# Patient Record
Sex: MALE | Race: WHITE | NOT HISPANIC OR LATINO | Employment: OTHER | URBAN - METROPOLITAN AREA
[De-identification: names, ages, dates, MRNs, and addresses within clinical notes are randomized per-mention and may not be internally consistent; named-entity substitution may affect disease eponyms.]

---

## 2017-05-03 ENCOUNTER — OFFICE VISIT (OUTPATIENT)
Dept: MEDICAL GROUP | Facility: MEDICAL CENTER | Age: 77
End: 2017-05-03
Payer: MEDICARE

## 2017-05-03 VITALS
RESPIRATION RATE: 16 BRPM | OXYGEN SATURATION: 97 % | SYSTOLIC BLOOD PRESSURE: 130 MMHG | HEIGHT: 70 IN | WEIGHT: 154 LBS | TEMPERATURE: 97.2 F | DIASTOLIC BLOOD PRESSURE: 76 MMHG | BODY MASS INDEX: 22.05 KG/M2 | HEART RATE: 69 BPM

## 2017-05-03 DIAGNOSIS — M81.0 AGE-RELATED OSTEOPOROSIS WITHOUT CURRENT PATHOLOGICAL FRACTURE: ICD-10-CM

## 2017-05-03 DIAGNOSIS — G62.9 NEUROPATHY: ICD-10-CM

## 2017-05-03 DIAGNOSIS — E78.5 DYSLIPIDEMIA: ICD-10-CM

## 2017-05-03 DIAGNOSIS — F41.9 ANXIETY: ICD-10-CM

## 2017-05-03 PROCEDURE — 3017F COLORECTAL CA SCREEN DOC REV: CPT | Performed by: NURSE PRACTITIONER

## 2017-05-03 PROCEDURE — G8420 CALC BMI NORM PARAMETERS: HCPCS | Performed by: NURSE PRACTITIONER

## 2017-05-03 PROCEDURE — 4040F PNEUMOC VAC/ADMIN/RCVD: CPT | Mod: 8P | Performed by: NURSE PRACTITIONER

## 2017-05-03 PROCEDURE — G8432 DEP SCR NOT DOC, RNG: HCPCS | Performed by: NURSE PRACTITIONER

## 2017-05-03 PROCEDURE — 1036F TOBACCO NON-USER: CPT | Performed by: NURSE PRACTITIONER

## 2017-05-03 PROCEDURE — 99203 OFFICE O/P NEW LOW 30 MIN: CPT | Performed by: NURSE PRACTITIONER

## 2017-05-03 PROCEDURE — 1101F PT FALLS ASSESS-DOCD LE1/YR: CPT | Performed by: NURSE PRACTITIONER

## 2017-05-03 RX ORDER — ALPRAZOLAM 0.25 MG/1
0.25 TABLET ORAL NIGHTLY PRN
Qty: 15 TAB | Refills: 2 | Status: SHIPPED | OUTPATIENT
Start: 2017-05-03 | End: 2017-09-26 | Stop reason: SDUPTHER

## 2017-05-03 RX ORDER — ALPRAZOLAM 0.25 MG/1
0.25 TABLET ORAL NIGHTLY PRN
COMMUNITY
End: 2017-05-03 | Stop reason: SDUPTHER

## 2017-05-03 ASSESSMENT — PATIENT HEALTH QUESTIONNAIRE - PHQ9: CLINICAL INTERPRETATION OF PHQ2 SCORE: 0

## 2017-05-03 ASSESSMENT — ENCOUNTER SYMPTOMS: NERVOUS/ANXIOUS: 1

## 2017-05-03 NOTE — MR AVS SNAPSHOT
"Augustus Richardson   5/3/2017 8:00 AM   Office Visit   MRN: [id number]    Department:  [address] [place] [address]   Dept Phone:  [phone]    Description:  Male : 1940   Provider:  JIL Garcia           Reason for Visit     Establish Care     Medication Refill           Allergies as of 5/3/2017     No Known Allergies      You were diagnosed with     Age related osteoporosis, unspecified pathological fracture presence   [4444750]       Neuropathy (CMS-HCC)   [934966]       Anxiety   [164100]       Dyslipidemia   [902505]         Vital Signs     Blood Pressure Pulse Temperature Respirations Height Weight    130/76 mmHg 69 36.2 °C (97.2 °F) 16 1.778 m (5' 10\") 69.854 kg (154 lb)    Body Mass Index Oxygen Saturation Smoking Status             22.10 kg/m2 97% Never Smoker          Basic Information     Date Of Birth Sex Preferred Language          1940 Male English        Problem List              ICD-10-CM Priority Class Noted - Resolved    Neuropathy (CMS-HCC) G62.9   5/3/2017 - Present    Age related osteoporosis M81.0   5/3/2017 - Present    Anxiety F41.9   5/3/2017 - Present    Dyslipidemia E78.5   5/3/2017 - Present      Health Maintenance        Date Due Completion Dates    IMM DTaP/Tdap/Td Vaccine (1 - Tdap) 1961 ---    IMM ZOSTER VACCINE 2020 (Originally 2002) ---    IMM PNEUMOCOCCAL 65+ (ADULT) LOW/MEDIUM RISK SERIES (1 of 2 - PCV13) 2021 (Originally 2007) ---    COLONOSCOPY 2026 (Done)    Override on 2016: Done (in [place])            Current Immunizations     No immunizations on file.      Below and/or attached are the medications your provider expects you to take. Review all of your home medications and newly ordered medications with your provider and/or pharmacist. Follow medication instructions as directed by your provider and/or pharmacist. Please keep your medication list with you and share with your provider. Update the " information when medications are discontinued, doses are changed, or new medications (including over-the-counter products) are added; and carry medication information at all times in the event of emergency situations     Allergies:  No Known Allergies          Medications  Valid as of: May 03, 2017 -  8:31 AM    Generic Name Brand Name Tablet Size Instructions for use    ALPRAZolam (Tab) XANAX 0.25 MG Take 1 Tab by mouth at bedtime as needed for Sleep.        .                 Medicines prescribed today were sent to:     None      Medication refill instructions:       If your prescription bottle indicates you have medication refills left, it is not necessary to call your provider’s office. Please contact your pharmacy and they will refill your medication.    If your prescription bottle indicates you do not have any refills left, you may request refills at any time through one of the following ways: The online MolecularMD system (except Urgent Care), by calling your provider’s office, or by asking your pharmacy to contact your provider’s office with a refill request. Medication refills are processed only during regular business hours and may not be available until the next business day. Your provider may request additional information or to have a follow-up visit with you prior to refilling your medication.   *Please Note: Medication refills are assigned a new Rx number when refilled electronically. Your pharmacy may indicate that no refills were authorized even though a new prescription for the same medication is available at the pharmacy. Please request the medicine by name with the pharmacy before contacting your provider for a refill.        Your To Do List     Future Labs/Procedures Complete By Expires    COMP METABOLIC PANEL  As directed 5/4/2018    LIPID PROFILE  As directed 5/4/2018    TSH  As directed 5/4/2018         MolecularMD Access Code: [email or web address]  Expires: 6/2/2017  8:31 AM    MolecularMD  A secure, online  tool to manage your health information     [employer]’s Top10.com® is a secure, online tool that connects you to your personalized health information from the privacy of your home -- day or night - making it very easy for you to manage your healthcare. Once the activation process is completed, you can even access your medical information using the Top10.com andre, which is available for free in the Apple Andre store or Google Play store.     [email or web address].com provides the following levels of access (as shown below):   My Chart Features   [facility] Doctor [facility]  Specialists [facility]  Urgent  Care Non-[facility]  Primary Care  Doctor   Email your healthcare team securely and privately 24/7 X X X    Manage appointments: schedule your next appointment; view details of past/upcoming appointments X      Request prescription refills. X      View recent personal medical records, including lab and immunizations X X X X   View health record, including health history, allergies, medications X X X X   Read reports about your outpatient visits, procedures, consult and ER notes X X X X   See your discharge summary, which is a recap of your hospital and/or ER visit that includes your diagnosis, lab results, and care plan. X X       How to register for [email or web address].com:  1. Go to  [email or web address].  2. Click on the Sign Up Now box, which takes you to the New Member Sign Up page. You will need to provide the following information:  a. Enter your Top10.com Access Code exactly as it appears at the top of this page. (You will not need to use this code after you’ve completed the sign-up process. If you do not sign up before the expiration date, you must request a new code.)   b. Enter your date of birth.   c. Enter your home email address.   d. Click Submit, and follow the next screen’s instructions.  3. Create a Top10.com ID. This will be your Top10.com login ID and cannot be changed, so think of one that is secure and easy to remember.  4. Create a  PowerbyProxi password. You can change your password at any time.  5. Enter your Password Reset Question and Answer. This can be used at a later time if you forget your password.   6. Enter your e-mail address. This allows you to receive e-mail notifications when new information is available in PowerbyProxi.  7. Click Sign Up. You can now view your health information.    For assistance activating your PowerbyProxi account, call [phone]

## 2017-05-03 NOTE — Clinical Note
[facility]  JIL Garcia  [address]  [place] [address]  Fax: [phone]   Authorization for Release/Disclosure of   Protected Health Information   Name: NILS CROFT : 1940 SSN: [id number]   Address: [address] [place] [address] [address]  [place] [address] Phone:    [phone] (home)    I authorize the entity listed below to release/disclose the PHI below to:   [facility]/JIL Garcia and JIL Garcia   Provider or Entity Name:     Address   City, State, [facility]   Phone:      Fax:     Reason for request: continuity of care   Information to be released:    [  ] LAST COLONOSCOPY,  including any PATH REPORT and follow-up  [  ] LAST FIT/COLOGUARD RESULT [  ] LAST DEXA  [  ] LAST MAMMOGRAM  [  ] LAST PAP  [  ] LAST LABS [  ] RETINA EXAM REPORT  [  ] IMMUNIZATION RECORDS  [  ] Release all info      [  ] Check here and initial the line next to each item to release ALL health information INCLUDING  _____ Care and treatment for drug and / or alcohol abuse  _____ HIV testing, infection status, or AIDS  _____ Genetic Testing    DATES OF SERVICE OR TIME PERIOD TO BE DISCLOSED: _____________  I understand and acknowledge that:  * This Authorization may be revoked at any time by you in writing, except if your health information has already been used or disclosed.  * Your health information that will be used or disclosed as a result of you signing this authorization could be re-disclosed by the recipient. If this occurs, your re-disclosed health information may no longer be protected by State or Federal laws.  * You may refuse to sign this Authorization. Your refusal will not affect your ability to obtain treatment.  * This Authorization becomes effective upon signing and will  on (date) __________.      If no date is indicated, this Authorization will  one (1) year from the signature date.    Name: Nils Croft    Signature:   Date:     5/3/2017       PLEASE FAX  REQUESTED RECORDS BACK TO: [phone]

## 2017-05-03 NOTE — PROGRESS NOTES
"Subjective:      Augustus Richardson is a 76 y.o. male who presents with Establish Care and Medication Refill            HPI Augustus Richardson is here today to establish care and for need of medications. He states he moved here from [facility] approximately a month ago and needs to establish with a new PCP.      1. Age related osteoporosis, no fracture present.  Patient states he was told he had osteoporosis but does not like to use prescription medicines and treats this with \"natural remedies\". He denies recent fractures.    2. Neuropathy (CMS-[facility])  Patient reports he was told he had neuropathy by risk chiropractor but did not have EMG studies. He states it affects his feet and hands but not on a regular basis and he takes no medicine for this.    3. Anxiety  Patient reports the only prescription medicine he uses Xanax 0.25 mg. He states he does not use it on a daily basis but does like to have it available to take at night for anxiety. He does not feel he has daily anxiety requiring an SSRI. He reports no side effects from the medicine.    4. Dyslipidemia  Patient states he did have elevated cholesterol in the past but did not want to use prescription medicine and uses natural medicines.    Current Outpatient Prescriptions   Medication Sig Dispense Refill   • alprazolam (XANAX) 0.25 MG Tab Take 1 Tab by mouth at bedtime as needed for Sleep. 15 Tab 2     No current facility-administered medications for this visit.     Social History   Substance Use Topics   • Smoking status: Never Smoker    • Smokeless tobacco: Never Used   • Alcohol Use: Yes      Comment: occas     Family History   Problem Relation Age of Onset   • Other Mother      unknown   • Cancer Father      pancreatic   History reviewed. No pertinent past medical history.    Review of Systems   Psychiatric/Behavioral: The patient is nervous/anxious.    All other systems reviewed and are negative.         Objective:     /76 mmHg  Pulse 69  Temp(Src) " "36.2 °C (97.2 °F)  Resp 16  Ht 1.778 m (5' 10\")  Wt 69.854 kg (154 lb)  BMI 22.10 kg/m2  SpO2 97%     Physical Exam   Constitutional: He is oriented to person, place, and time. He appears well-developed and well-nourished. No distress.   HENT:   Head: Normocephalic and atraumatic.   Right Ear: External ear normal.   Left Ear: External ear normal.   Nose: Nose normal.   Mouth/Throat: Oropharynx is clear and moist.   Eyes: Conjunctivae are normal. Right eye exhibits no discharge. Left eye exhibits no discharge.   Neck: Normal range of motion. Neck supple. No tracheal deviation present. No thyromegaly present.   Cardiovascular: Normal rate, regular rhythm and normal heart sounds.    No murmur heard.  Pulmonary/Chest: Effort normal and breath sounds normal. No respiratory distress. He has no wheezes. He has no rales.   Lymphadenopathy:     He has no cervical adenopathy.   Neurological: He is alert and oriented to person, place, and time. Coordination normal.   Skin: Skin is warm and dry. No rash noted. He is not diaphoretic. No erythema.   Psychiatric: He has a normal mood and affect. His behavior is normal. Judgment and thought content normal.   Nursing note and vitals reviewed.              Assessment/Plan:     1. Neuropathy (CMS-[facility])  Patient states this was diagnosed by his chiropractor and has not had EMG studies.    2. Anxiety  I reviewed with patient the risks with long-term Xanax usage, especially in his age group. He did sign a controlled substance agreement today. He does not appear to be using on a daily basis although I was unable to check on the Board of pharmacy site today because it was not working. He felt #15 would be adequate for the month. He does not feel he needs counseling. He reports no falls with the medicine.  - alprazolam (XANAX) 0.25 MG Tab; Take 1 Tab by mouth at bedtime as needed for Sleep.  Dispense: 15 Tab; Refill: 2  - Controlled Substance Treatment Agreement  - TSH; Future    3. " Dyslipidemia  I will do lab work before his next visit and we'll discuss treatment options if his cholesterol is still high.  - COMP METABOLIC PANEL; Future  - LIPID PROFILE; Future    4. Age-related osteoporosis without current pathological fracture  Unclear as to how this was diagnosed but we will try to get records and patient states he would not take a bisphosphonate if it was offered.

## 2017-06-28 ENCOUNTER — HOSPITAL ENCOUNTER (OUTPATIENT)
Dept: LAB | Facility: MEDICAL CENTER | Age: 77
End: 2017-06-28
Attending: NURSE PRACTITIONER
Payer: MEDICARE

## 2017-06-28 ENCOUNTER — OFFICE VISIT (OUTPATIENT)
Dept: MEDICAL GROUP | Facility: MEDICAL CENTER | Age: 77
End: 2017-06-28
Payer: MEDICARE

## 2017-06-28 VITALS
HEART RATE: 74 BPM | HEIGHT: 70 IN | OXYGEN SATURATION: 96 % | BODY MASS INDEX: 21.9 KG/M2 | DIASTOLIC BLOOD PRESSURE: 74 MMHG | RESPIRATION RATE: 16 BRPM | WEIGHT: 153 LBS | SYSTOLIC BLOOD PRESSURE: 124 MMHG | TEMPERATURE: 98.6 F

## 2017-06-28 DIAGNOSIS — F41.9 ANXIETY: ICD-10-CM

## 2017-06-28 DIAGNOSIS — E78.5 DYSLIPIDEMIA: ICD-10-CM

## 2017-06-28 DIAGNOSIS — Z91.09 ENVIRONMENTAL ALLERGIES: ICD-10-CM

## 2017-06-28 LAB
ALBUMIN SERPL BCP-MCNC: 4 G/DL (ref 3.2–4.9)
ALBUMIN/GLOB SERPL: 1.5 G/DL
ALP SERPL-CCNC: 73 U/L (ref 30–99)
ALT SERPL-CCNC: 12 U/L (ref 2–50)
ANION GAP SERPL CALC-SCNC: 5 MMOL/L (ref 0–11.9)
AST SERPL-CCNC: 18 U/L (ref 12–45)
BILIRUB SERPL-MCNC: 0.7 MG/DL (ref 0.1–1.5)
BUN SERPL-MCNC: 22 MG/DL (ref 8–22)
CALCIUM SERPL-MCNC: 9.1 MG/DL (ref 8.5–10.5)
CHLORIDE SERPL-SCNC: 108 MMOL/L (ref 96–112)
CHOLEST SERPL-MCNC: 227 MG/DL (ref 100–199)
CO2 SERPL-SCNC: 29 MMOL/L (ref 20–33)
CREAT SERPL-MCNC: 1.13 MG/DL (ref 0.5–1.4)
GFR SERPL CREATININE-BSD FRML MDRD: >60 ML/MIN/1.73 M 2
GLOBULIN SER CALC-MCNC: 2.6 G/DL (ref 1.9–3.5)
GLUCOSE SERPL-MCNC: 89 MG/DL (ref 65–99)
HDLC SERPL-MCNC: 42 MG/DL
LDLC SERPL CALC-MCNC: 160 MG/DL
POTASSIUM SERPL-SCNC: 4.4 MMOL/L (ref 3.6–5.5)
PROT SERPL-MCNC: 6.6 G/DL (ref 6–8.2)
SODIUM SERPL-SCNC: 142 MMOL/L (ref 135–145)
TRIGL SERPL-MCNC: 127 MG/DL (ref 0–149)
TSH SERPL DL<=0.005 MIU/L-ACNC: 0.84 UIU/ML (ref 0.3–3.7)

## 2017-06-28 PROCEDURE — 80053 COMPREHEN METABOLIC PANEL: CPT

## 2017-06-28 PROCEDURE — 36415 COLL VENOUS BLD VENIPUNCTURE: CPT

## 2017-06-28 PROCEDURE — 80061 LIPID PANEL: CPT

## 2017-06-28 PROCEDURE — 84443 ASSAY THYROID STIM HORMONE: CPT

## 2017-06-28 PROCEDURE — 99214 OFFICE O/P EST MOD 30 MIN: CPT | Performed by: NURSE PRACTITIONER

## 2017-06-28 RX ORDER — FLUTICASONE PROPIONATE 50 MCG
2 SPRAY, SUSPENSION (ML) NASAL DAILY
Qty: 16 G | Refills: 11 | COMMUNITY
Start: 2017-06-28 | End: 2017-12-21

## 2017-06-28 RX ORDER — FEXOFENADINE HCL 180 MG/1
180 TABLET ORAL DAILY
Qty: 30 TAB | COMMUNITY
Start: 2017-06-28 | End: 2017-12-21

## 2017-06-28 RX ORDER — BENZONATATE 100 MG/1
100 CAPSULE ORAL
Qty: 30 CAP | Refills: 2 | Status: SHIPPED | OUTPATIENT
Start: 2017-06-28 | End: 2017-12-21

## 2017-06-28 NOTE — MR AVS SNAPSHOT
"        Augustus Richardson   2017 9:20 AM   Office Visit   MRN: [id number]    Department:  [address] [place] [address]   Dept Phone:  [phone]    Description:  Male : 1940   Provider:  JIL Garcia           Reason for Visit     Follow-Up           Allergies as of 2017     No Known Allergies      You were diagnosed with     Environmental allergies   [640235]       Anxiety   [631997]       Dyslipidemia   [689728]         Vital Signs     Blood Pressure Pulse Temperature Respirations Height Weight    124/74 mmHg 74 37 °C (98.6 °F) 16 1.778 m (5' 10\") 69.4 kg (153 lb)    Body Mass Index Oxygen Saturation Smoking Status             21.95 kg/m2 96% Never Smoker          Basic Information     Date Of Birth Sex Race Ethnicity Preferred Language    1940 Male Unable to Obtain Non- English      Problem List              ICD-10-CM Priority Class Noted - Resolved    Neuropathy (CMS-HCC) G62.9   5/3/2017 - Present    Age related osteoporosis M81.0   5/3/2017 - Present    Anxiety F41.9   5/3/2017 - Present    Dyslipidemia E78.5   5/3/2017 - Present    Environmental allergies Z91.09   2017 - Present      Health Maintenance        Date Due Completion Dates    IMM DTaP/Tdap/Td Vaccine (1 - Tdap) 1961 ---    IMM ZOSTER VACCINE 2020 (Originally 2002) ---    IMM PNEUMOCOCCAL 65+ (ADULT) LOW/MEDIUM RISK SERIES (1 of 2 - PCV13) 2021 (Originally 2007) ---    COLONOSCOPY 2026 (Done)    Override on 2016: Done (in [place])            Current Immunizations     No immunizations on file.      Below and/or attached are the medications your provider expects you to take. Review all of your home medications and newly ordered medications with your provider and/or pharmacist. Follow medication instructions as directed by your provider and/or pharmacist. Please keep your medication list with you and share with your provider. Update the information when medications " are discontinued, doses are changed, or new medications (including over-the-counter products) are added; and carry medication information at all times in the event of emergency situations     Allergies:  No Known Allergies          Medications  Valid as of: June 28, 2017 -  9:30 AM    Generic Name Brand Name Tablet Size Instructions for use    ALPRAZolam (Tab) XANAX 0.25 MG Take 1 Tab by mouth at bedtime as needed for Sleep.        Benzonatate (Cap) TESSALON 100 MG Take 1 Cap by mouth 1 time daily as needed for Cough.        Fexofenadine HCl (Tab) ALLEGRA 180 MG Take 1 Tab by mouth every day.        Fluticasone Propionate (Suspension) FLONASE 50 MCG/ACT Spray 2 Sprays in nose every day.        .                 Medicines prescribed today were sent to:     [facility] [address] [place] [address]    Phone: [phone] Fax: [phone]    Open 24 Hours?: No      Medication refill instructions:       If your prescription bottle indicates you have medication refills left, it is not necessary to call your provider’s office. Please contact your pharmacy and they will refill your medication.    If your prescription bottle indicates you do not have any refills left, you may request refills at any time through one of the following ways: The online ConnectSoft system (except Urgent Care), by calling your provider’s office, or by asking your pharmacy to contact your provider’s office with a refill request. Medication refills are processed only during regular business hours and may not be available until the next business day. Your provider may request additional information or to have a follow-up visit with you prior to refilling your medication.   *Please Note: Medication refills are assigned a new Rx number when refilled electronically. Your pharmacy may indicate that no refills were authorized even though a new prescription for the same medication is available at the  pharmacy. Please request the medicine by name with the pharmacy before contacting your provider for a refill.           Entreda Access Code: [email or web address]  Expires: 7/28/2017  9:30 AM    Entreda  A secure, online tool to manage your health information     The [employer] Entreda® is a secure, online tool that connects you to your personalized health information from the privacy of your home -- day or night - making it very easy for you to manage your healthcare. Once the activation process is completed, you can even access your medical information using the Entreda andre, which is available for free in the Apple Andre store or Google Play store.     Entreda provides the following levels of access (as shown below):   My Chart Features   [facility] Primary [facility] Doctor [facility]  Specialists [facility]  Urgent  Care Non-[facility]  Primary Care  Doctor   Email your healthcare team securely and privately 24/7 X X X    Manage appointments: schedule your next appointment; view details of past/upcoming appointments X      Request prescription refills. X      View recent personal medical records, including lab and immunizations X X X X   View health record, including health history, allergies, medications X X X X   Read reports about your outpatient visits, procedures, consult and ER notes X X X X   See your discharge summary, which is a recap of your hospital and/or ER visit that includes your diagnosis, lab results, and care plan. X X       How to register for Entreda:  1. Go to  [email or web address].  2. Click on the Sign Up Now box, which takes you to the New Member Sign Up page. You will need to provide the following information:  a. Enter your Entreda Access Code exactly as it appears at the top of this page. (You will not need to use this code after you’ve completed the sign-up process. If you do not sign up before the expiration date, you must request a new code.)   b. Enter your date of birth.   c. Enter your home email  address.   d. Click Submit, and follow the next screen’s instructions.  3. Create a The Political Studentt ID. This will be your Kensho login ID and cannot be changed, so think of one that is secure and easy to remember.  4. Create a The Political Studentt password. You can change your password at any time.  5. Enter your Password Reset Question and Answer. This can be used at a later time if you forget your password.   6. Enter your e-mail address. This allows you to receive e-mail notifications when new information is available in Kensho.  7. Click Sign Up. You can now view your health information.    For assistance activating your Kensho account, call [phone]

## 2017-06-28 NOTE — PROGRESS NOTES
"Subjective:      Augustus Richardson is a 76 y.o. male who presents with Follow-Up            HPI Augustus Richardson is here today requesting a prescription for benzonatate as well as to talk about his allergies.      1. Environmental allergies  Patient reports he tends to get allergies certain times of year and symptoms usually include cough, postnasal drip, sinus congestion and watery itchy eyes. He has not been using anything on a regular basis for this. He denies fever, chills, sore throat or ear pain. He has with him a coupon for benzoate and would like to have this available for his cough when it occurs at night.    2. Anxiety  The patient's last visit he was given a prescription for Xanax which he states he filled and uses infrequently but there is nothing on the prescription monitoring program for this.    3. Dyslipidemia  Patient reports he will be doing his lab work today. He is not currently on a statin and probably would not go on one because he states he likes to use \"natural\" medications.    Social History   Substance Use Topics   • Smoking status: Never Smoker    • Smokeless tobacco: Never Used   • Alcohol Use: Yes      Comment: occas     Current Outpatient Prescriptions   Medication Sig Dispense Refill   • benzonatate (TESSALON) 100 MG Cap Take 1 Cap by mouth 1 time daily as needed for Cough. 30 Cap 2   • fluticasone (FLONASE) 50 MCG/ACT nasal spray Spray 2 Sprays in nose every day. 16 g 11   • fexofenadine (ALLEGRA) 180 MG tablet Take 1 Tab by mouth every day. 30 Tab    • alprazolam (XANAX) 0.25 MG Tab Take 1 Tab by mouth at bedtime as needed for Sleep. 15 Tab 2     No current facility-administered medications for this visit.     Family History   Problem Relation Age of Onset   • Other Mother      unknown   • Cancer Father      pancreatic   History reviewed. No pertinent past medical history.    Review of Systems   Endo/Heme/Allergies: Positive for environmental allergies.   All other systems reviewed " "and are negative.         Objective:     /74 mmHg  Pulse 74  Temp(Src) 37 °C (98.6 °F)  Resp 16  Ht 1.778 m (5' 10\")  Wt 69.4 kg (153 lb)  BMI 21.95 kg/m2  SpO2 96%     Physical Exam   Constitutional: He is oriented to person, place, and time. He appears well-developed and well-nourished. No distress.   HENT:   Head: Normocephalic and atraumatic.   Right Ear: External ear normal.   Left Ear: External ear normal.   Nose: Nose normal.   Mouth/Throat: Oropharynx is clear and moist.   Eyes: Conjunctivae are normal. Right eye exhibits no discharge. Left eye exhibits no discharge.   Neck: Normal range of motion. Neck supple. No tracheal deviation present. No thyromegaly present.   Cardiovascular: Normal rate, regular rhythm and normal heart sounds.    No murmur heard.  Pulmonary/Chest: Effort normal and breath sounds normal. No respiratory distress. He has no wheezes. He has no rales.   Lymphadenopathy:     He has no cervical adenopathy.   Neurological: He is alert and oriented to person, place, and time. Coordination normal.   Skin: Skin is warm and dry. No rash noted. He is not diaphoretic. No erythema.   Psychiatric: He has a normal mood and affect. His behavior is normal. Judgment and thought content normal.   Nursing note and vitals reviewed.              Assessment/Plan:   1. Environmental allergies  I advised patient his first line of treatment should be avoidance of allergens and then using medication like Allegra and Flonase nasal spray. I did give him a prescription for benzoate with the understanding this is not used on a regular basis for allergies. I told him if the cough worsens he should get a chest x-ray and I will order it. He did not want one today.  - benzonatate (TESSALON) 100 MG Cap; Take 1 Cap by mouth 1 time daily as needed for Cough.  Dispense: 30 Cap; Refill: 2  - fluticasone (FLONASE) 50 MCG/ACT nasal spray; Spray 2 Sprays in nose every day.  Dispense: 16 g; Refill: 11  - " fexofenadine (ALLEGRA) 180 MG tablet; Take 1 Tab by mouth every day.  Dispense: 30 Tab    2. Anxiety  Patient apparently didn't fill his Xanax prescription and is using it only as needed.    3. Dyslipidemia  Patient to do his lab work and follow-up his cholesterol medication as needed.

## 2017-09-26 ENCOUNTER — TELEPHONE (OUTPATIENT)
Dept: MEDICAL GROUP | Facility: MEDICAL CENTER | Age: 77
End: 2017-09-26

## 2017-09-26 DIAGNOSIS — F41.9 ANXIETY: ICD-10-CM

## 2017-09-26 RX ORDER — ALPRAZOLAM 0.25 MG/1
TABLET ORAL
Qty: 15 TAB | Refills: 2 | Status: SHIPPED | OUTPATIENT
Start: 2017-09-26 | End: 2017-11-29 | Stop reason: SDUPTHER

## 2017-09-27 ENCOUNTER — OFFICE VISIT (OUTPATIENT)
Dept: MEDICAL GROUP | Facility: MEDICAL CENTER | Age: 77
End: 2017-09-27
Payer: MEDICARE

## 2017-09-27 VITALS
OXYGEN SATURATION: 96 % | RESPIRATION RATE: 14 BRPM | HEIGHT: 70 IN | WEIGHT: 159 LBS | SYSTOLIC BLOOD PRESSURE: 122 MMHG | TEMPERATURE: 98.6 F | DIASTOLIC BLOOD PRESSURE: 72 MMHG | HEART RATE: 74 BPM | BODY MASS INDEX: 22.76 KG/M2

## 2017-09-27 DIAGNOSIS — G56.03 BILATERAL CARPAL TUNNEL SYNDROME: ICD-10-CM

## 2017-09-27 DIAGNOSIS — Z91.09 ENVIRONMENTAL ALLERGIES: ICD-10-CM

## 2017-09-27 DIAGNOSIS — Z00.00 MEDICARE ANNUAL WELLNESS VISIT, SUBSEQUENT: ICD-10-CM

## 2017-09-27 DIAGNOSIS — M25.542 ARTHRALGIA OF BOTH HANDS: ICD-10-CM

## 2017-09-27 DIAGNOSIS — F41.9 ANXIETY: ICD-10-CM

## 2017-09-27 DIAGNOSIS — M25.541 ARTHRALGIA OF BOTH HANDS: ICD-10-CM

## 2017-09-27 DIAGNOSIS — M81.0 AGE RELATED OSTEOPOROSIS, UNSPECIFIED PATHOLOGICAL FRACTURE PRESENCE: ICD-10-CM

## 2017-09-27 DIAGNOSIS — E78.5 DYSLIPIDEMIA: ICD-10-CM

## 2017-09-27 DIAGNOSIS — A60.00 GENITAL HERPES SIMPLEX, UNSPECIFIED SITE: ICD-10-CM

## 2017-09-27 DIAGNOSIS — G62.9 NEUROPATHY: ICD-10-CM

## 2017-09-27 PROCEDURE — G0438 PPPS, INITIAL VISIT: HCPCS | Mod: 25 | Performed by: NURSE PRACTITIONER

## 2017-09-27 PROCEDURE — 99213 OFFICE O/P EST LOW 20 MIN: CPT | Mod: 25 | Performed by: NURSE PRACTITIONER

## 2017-09-27 RX ORDER — ACYCLOVIR 400 MG/1
400 TABLET ORAL 3 TIMES DAILY
Qty: 15 TAB | Refills: 0 | Status: SHIPPED | OUTPATIENT
Start: 2017-09-27 | End: 2017-10-18 | Stop reason: SDUPTHER

## 2017-09-27 ASSESSMENT — PATIENT HEALTH QUESTIONNAIRE - PHQ9: CLINICAL INTERPRETATION OF PHQ2 SCORE: 0

## 2017-09-27 NOTE — PROGRESS NOTES
CC: Patient is here today for annual Medicare wellness visit as well as new complaints of genital herpes and joint pain and numbness.    HPI:   Augustus presents today with the following.    1. Medicare annual wellness visit, subsequent  Screening performed below.    2. Genital herpes simplex, unspecified site  Patient reports he has had on and off issues with genital herpes for decades. He normally does not treat with anything. His current outbreak has been present for about a week. He complains of vesicles that occur in the groin area and it happens about twice a year. Patient is much into holistic medicine and brings with him an over-the-counter ointment which she tried but also would like to discuss prescription medicine.    3. Arthralgia of both hands  Patient states he has had problems with joint pain mostly of his hands for a few years. He goes to a chiropractor on a regular basis. He has not noticed redness or swelling although there is now beginning some deformities of the hands. He does not remember being checked for rheumatoid arthritis.    4. Bilateral carpal tunnel syndrome  Patient states he was told he might have carpal tunnel of his hands bilaterally because he does get numbness of his fingers of both his hands occasionally. He has been wearing wrist splints which helped slightly. His symptoms are worse at night.    5. Dyslipidemia  Patient is new to the clinic and he had lab work done earlier this year which showed elevated LDL cholesterol but patient did not want to go on a statin.    6. Environmental allergies  Patient states his allergies are not currently bothering him.    7. Anxiety  Patient uses occasional Xanax for anxiety.    8. Age related osteoporosis, unspecified pathological fracture presence  Patient apparently has history of osteoporosis but again has declined any prescription treatment and takes calcium and vitamin D.    9. Neuropathy (CMS-[facility])  Patient came to the office reporting  history of bilateral lower extremity neuropathy without diabetes. He treats with homeopathic medication.      Depression Screening    Little interest or pleasure in doing things?  0 - not at all  Feeling down, depressed , or hopeless? 0 - not at all  Patient Health Questionnaire Score: 0     If depressive symptoms identified deferred to follow up visit unless specifically addressed in assessment and plan.    Interpretation of PHQ-9 Total Score   Score Severity   1-4 No Depression   5-9 Mild Depression   10-14 Moderate Depression   15-19 Moderately Severe Depression   20-27 Severe Depression    Screening for Cognitive Impairment    Three Minute Recall (apple, watch, wayne)  3/3 3/3  Draw clock face with all 12 numbers set to the hand to show 10 minutes past 11 o'clock  1 5/5  Cognitive concerns identified deferred for follow up unless specifically addressed in assessment and plan.    Fall Risk Assessment    Has the patient had two or more falls in the last year or any fall with injury in the last year?  No    Safety Assessment    Throw rugs on floor.  Yes  Handrails on all stairs.  Yes  Good lighting in all hallways.  Yes  Difficulty hearing.  No  Patient counseled about all safety risks that were identified.    Functional Assessment ADLs    Are there any barriers preventing you from cooking for yourself or meeting nutritional needs?  No.    Are there any barriers preventing you from driving safely or obtaining transportation?  No.    Are there any barriers preventing you from using a telephone or calling for help?  No.    Are there any barriers preventing you from shopping?  No.    Are there any barriers preventing you from taking care of your own finances?  No.    Are there any barriers preventing you from managing your medications?  No.    Are currently engaging any exercise or physical activity?  Yes.       Health Maintenance Summary                Annual Wellness Visit Overdue 1940     IMM DTaP/Tdap/Td  "Vaccine Overdue 12/22/1961     IMM INFLUENZA Overdue 9/1/2017     IMM ZOSTER VACCINE Postponed 5/7/2020 Originally 12/22/2002. Patient Refused    IMM PNEUMOCOCCAL 65+ (ADULT) LOW/MEDIUM RISK SERIES Postponed 4/29/2021 Originally 12/22/2007. Patient Refused    COLONOSCOPY Next Due 4/29/2026      Done 4/29/2016 in [place]          Patient Care Team:  JIL Garcia as PCP - General (Family Medicine)        Patient Active Problem List    Diagnosis Date Noted   • Genital herpes simplex 09/27/2017   • Environmental allergies 06/28/2017   • Neuropathy (CMS-HCC) 05/03/2017   • Age related osteoporosis 05/03/2017   • Anxiety 05/03/2017   • Dyslipidemia 05/03/2017       Current Outpatient Prescriptions   Medication Sig Dispense Refill   • NON SPECIFIED      • acyclovir (ZOVIRAX) 400 MG tablet Take 1 Tab by mouth 3 times a day for 5 days. 15 Tab 0   • alprazolam (XANAX) 0.25 MG Tab TAKE ONE TABLET BY MOUTH AT BEDTIME AS NEEDED FOR SLEEP 15 Tab 2   • fluticasone (FLONASE) 50 MCG/ACT nasal spray Spray 2 Sprays in nose every day. 16 g 11   • fexofenadine (ALLEGRA) 180 MG tablet Take 1 Tab by mouth every day. 30 Tab    • benzonatate (TESSALON) 100 MG Cap Take 1 Cap by mouth 1 time daily as needed for Cough. 30 Cap 2     No current facility-administered medications for this visit.          Allergies as of 09/27/2017   • (No Known Allergies)        ROS: As per HPI.    /72   Pulse 74   Temp 37 °C (98.6 °F)   Resp 14   Ht 1.778 m (5' 10\")   Wt 72.1 kg (159 lb)   SpO2 96%   BMI 22.81 kg/m²     Physical Exam:  Gen:         Alert and oriented, No apparent distress.  Neck:        No Lymphadenopathy or Bruits.  Lungs:     Clear to auscultation bilaterally  CV:          Regular rate and rhythm. No murmurs, rubs or gallops.  Abd:         Soft non tender, non distended. Normal active bowel sounds.  No  Hepatosplenomegaly, No pulsatile masses.                   Ext:          No clubbing, cyanosis, edema. There is a " slight deviation of the fingers of the hands bilaterally with no swelling or redness of the joints. Some numbness reported in the fingers of the hands bilaterally.      Assessment and Plan.   76 y.o. male with the following issues.    1. Medicare annual wellness visit, subsequent  Annual wellness topics discussed, review of chronic medical problems completed    - Initial Wellness Visit - Includes PPPS ()    2. Genital herpes simplex, unspecified site  I discussed with patient the pros and cons of acyclovir and he would like to have it available in the future. His current symptoms present for a week so I explained it probably would not help him at this point. He did not feel it happens often enough to need prophylactic medication.  - acyclovir (ZOVIRAX) 400 MG tablet; Take 1 Tab by mouth 3 times a day for 5 days.  Dispense: 15 Tab; Refill: 0    3. Arthralgia of both hands  I would like to check for rheumatoid arthritis and if results are positive he will be referred to rheumatology.  - RHEUMATOID ARTHRITIS FACTOR; Future  - CCP ANTIBODY; Future  - WESTERGREN SED RATE; Future  - DX-JOINT SURVEY-HANDS SINGLE VIEW; Future    4. Bilateral carpal tunnel syndrome  Possible carpal tunnel although his neuropathy could also be a cause and I recommended EMG studies and referral to physiatry but he declined. He states he is happy going to his chiropractor.    5. Dyslipidemia  I spoke with patient about the risk of untreated dyslipidemia and his LDL was in the 160 range but he declines medication.    6. Environmental allergies  Patient will use his antihistamines and steroid nasal spray as needed.    7. Anxiety  Patient will try to use the Xanax only as needed.    8. Age related osteoporosis, unspecified pathological fracture presence  I will continue to recommend bone density scanning and if showing osteoporosis recommend Fosamax. Patient at this point refuses medicines.    9. Neuropathy (CMS-HCC)  As mentioned above, I  feel an EMG would be helpful but he declines.

## 2017-10-02 ENCOUNTER — APPOINTMENT (OUTPATIENT)
Dept: RADIOLOGY | Facility: IMAGING CENTER | Age: 77
End: 2017-10-02
Attending: NURSE PRACTITIONER
Payer: MEDICARE

## 2017-10-02 DIAGNOSIS — M25.542 ARTHRALGIA OF BOTH HANDS: ICD-10-CM

## 2017-10-02 DIAGNOSIS — M25.541 ARTHRALGIA OF BOTH HANDS: ICD-10-CM

## 2017-10-02 PROCEDURE — 77077 JOINT SURVEY SINGLE VIEW: CPT | Mod: TC | Performed by: FAMILY MEDICINE

## 2017-10-03 ENCOUNTER — HOSPITAL ENCOUNTER (OUTPATIENT)
Dept: LAB | Facility: MEDICAL CENTER | Age: 77
End: 2017-10-03
Attending: NURSE PRACTITIONER
Payer: MEDICARE

## 2017-10-03 DIAGNOSIS — M25.541 ARTHRALGIA OF BOTH HANDS: ICD-10-CM

## 2017-10-03 DIAGNOSIS — M25.542 ARTHRALGIA OF BOTH HANDS: ICD-10-CM

## 2017-10-03 LAB
ERYTHROCYTE [SEDIMENTATION RATE] IN BLOOD BY WESTERGREN METHOD: 12 MM/HOUR (ref 0–20)
RHEUMATOID FACT SER IA-ACNC: <10 IU/ML (ref 0–14)

## 2017-10-03 PROCEDURE — 86200 CCP ANTIBODY: CPT

## 2017-10-03 PROCEDURE — 36415 COLL VENOUS BLD VENIPUNCTURE: CPT

## 2017-10-03 PROCEDURE — 86431 RHEUMATOID FACTOR QUANT: CPT

## 2017-10-03 PROCEDURE — 85652 RBC SED RATE AUTOMATED: CPT

## 2017-10-05 LAB — CCP IGG SERPL-ACNC: 3 UNITS (ref 0–19)

## 2017-10-18 DIAGNOSIS — A60.00 GENITAL HERPES SIMPLEX, UNSPECIFIED SITE: ICD-10-CM

## 2017-10-18 RX ORDER — ACYCLOVIR 400 MG/1
TABLET ORAL
Qty: 15 TAB | Refills: 0 | Status: SHIPPED | OUTPATIENT
Start: 2017-10-18 | End: 2017-12-21

## 2017-10-30 ENCOUNTER — OFFICE VISIT (OUTPATIENT)
Dept: MEDICAL GROUP | Facility: MEDICAL CENTER | Age: 77
End: 2017-10-30
Payer: MEDICARE

## 2017-10-30 VITALS
RESPIRATION RATE: 16 BRPM | HEART RATE: 58 BPM | OXYGEN SATURATION: 97 % | DIASTOLIC BLOOD PRESSURE: 82 MMHG | SYSTOLIC BLOOD PRESSURE: 144 MMHG | HEIGHT: 70 IN | BODY MASS INDEX: 22.62 KG/M2 | TEMPERATURE: 98.7 F | WEIGHT: 158 LBS

## 2017-10-30 DIAGNOSIS — M54.31 RIGHT SCIATIC NERVE PAIN: ICD-10-CM

## 2017-10-30 PROCEDURE — 99214 OFFICE O/P EST MOD 30 MIN: CPT | Performed by: NURSE PRACTITIONER

## 2017-10-30 RX ORDER — DICLOFENAC SODIUM 75 MG/1
75 TABLET, DELAYED RELEASE ORAL 2 TIMES DAILY
Qty: 60 TAB | Refills: 0 | Status: SHIPPED | OUTPATIENT
Start: 2017-10-30 | End: 2017-12-21

## 2017-10-30 RX ORDER — TIZANIDINE 4 MG/1
4 TABLET ORAL 2 TIMES DAILY
Qty: 30 TAB | Refills: 0 | Status: SHIPPED | OUTPATIENT
Start: 2017-10-30 | End: 2017-12-21

## 2017-10-30 ASSESSMENT — ENCOUNTER SYMPTOMS: BACK PAIN: 1

## 2017-10-30 ASSESSMENT — PAIN SCALES - GENERAL: PAINLEVEL: 7=MODERATE-SEVERE PAIN

## 2017-10-30 NOTE — PROGRESS NOTES
Subjective:      Augustus Richardson is a 76 y.o. male who presents with Leg Pain (pulled right leg muscle 4 days ago)            HPI Augustus Richardson Is here today for complaints of pain radiating into his right leg.    Patient reports that in the past he has had problems with pain mostly in his right gluteal area radiating down his right leg. It usually occurred when he was sitting in his car for prolonged periods of time. His current symptoms started 4 days ago after golfing. Since then he has been having pain that goes down his right leg posteriorly to his foot. He denies any weakness or numbness of the extremities. He denies loss of bowel or bladder control. He has been going to a chiropractor but it has not helped. He denies fever or chills. He is not using any medicine for this.        Social History   Substance Use Topics   • Smoking status: Never Smoker   • Smokeless tobacco: Never Used   • Alcohol use Yes      Comment: occas     Current Outpatient Prescriptions   Medication Sig Dispense Refill   • diclofenac EC (VOLTAREN) 75 MG Tablet Delayed Response Take 1 Tab by mouth 2 times a day. 60 Tab 0   • tizanidine (ZANAFLEX) 4 MG Tab Take 1 Tab by mouth 2 times a day. 30 Tab 0   • acyclovir (ZOVIRAX) 400 MG tablet TAKE ONE TABLET BY MOUTH THREE TIMES DAILY FOR 5 DAYS 15 Tab 0   • alprazolam (XANAX) 0.25 MG Tab TAKE ONE TABLET BY MOUTH AT BEDTIME AS NEEDED FOR SLEEP 15 Tab 2   • NON SPECIFIED      • benzonatate (TESSALON) 100 MG Cap Take 1 Cap by mouth 1 time daily as needed for Cough. 30 Cap 2   • fluticasone (FLONASE) 50 MCG/ACT nasal spray Spray 2 Sprays in nose every day. 16 g 11   • fexofenadine (ALLEGRA) 180 MG tablet Take 1 Tab by mouth every day. 30 Tab      No current facility-administered medications for this visit.    History reviewed. No pertinent past medical history.   Family History   Problem Relation Age of Onset   • Other Mother      unknown   • Cancer Father      pancreatic       Review of  "Systems   Musculoskeletal: Positive for back pain.   All other systems reviewed and are negative.         Objective:     /82   Pulse (!) 58   Temp 37.1 °C (98.7 °F)   Resp 16   Ht 1.778 m (5' 10\")   Wt 71.7 kg (158 lb)   SpO2 97%   BMI 22.67 kg/m²      Physical Exam   Constitutional: He is oriented to person, place, and time. He appears well-developed and well-nourished. No distress.   HENT:   Head: Normocephalic and atraumatic.   Right Ear: External ear normal.   Left Ear: External ear normal.   Nose: Nose normal.   Mouth/Throat: Oropharynx is clear and moist.   Eyes: Conjunctivae are normal. Right eye exhibits no discharge. Left eye exhibits no discharge.   Neck: Normal range of motion. Neck supple. No tracheal deviation present. No thyromegaly present.   Cardiovascular: Normal rate, regular rhythm and normal heart sounds.    No murmur heard.  Pulmonary/Chest: Effort normal and breath sounds normal. No respiratory distress. He has no wheezes. He has no rales.   Musculoskeletal:   No tenderness to palpation but pain starts along the right gluteal area and goes posterior down his leg to his foot.   Lymphadenopathy:     He has no cervical adenopathy.   Neurological: He is alert and oriented to person, place, and time. Coordination normal.   5/5 strength of lower extremities bilaterally.   Skin: Skin is warm and dry. No rash noted. He is not diaphoretic. No erythema.   Psychiatric: He has a normal mood and affect. His behavior is normal. Judgment and thought content normal.   Nursing note and vitals reviewed.              Assessment/Plan:     1. Right sciatic nerve pain  Patient's symptoms sound sciatic and physical therapy has not been tried so I will have him switch from chiropractic to physical therapy. He declined x-rays. I will put him on anti-inflammatory short-term and he will take this with food to prevent GI upset. He was given a muscle relaxer to use only when he does not need to be alert. I " told him if this does not improve he will need a referral to physiatry and imaging studies. A handout on back exercises was given.  - REFERRAL TO PHYSICAL THERAPY Reason for Therapy: Eval/Treat/Report  - diclofenac EC (VOLTAREN) 75 MG Tablet Delayed Response; Take 1 Tab by mouth 2 times a day.  Dispense: 60 Tab; Refill: 0  - tizanidine (ZANAFLEX) 4 MG Tab; Take 1 Tab by mouth 2 times a day.  Dispense: 30 Tab; Refill: 0

## 2017-11-29 DIAGNOSIS — F41.9 ANXIETY: ICD-10-CM

## 2017-11-29 RX ORDER — ALPRAZOLAM 0.25 MG/1
TABLET ORAL
Qty: 15 TAB | Refills: 2 | Status: SHIPPED | OUTPATIENT
Start: 2017-11-29 | End: 2018-01-29 | Stop reason: SDUPTHER

## 2017-12-21 ENCOUNTER — PATIENT OUTREACH (OUTPATIENT)
Dept: HEALTH INFORMATION MANAGEMENT | Facility: OTHER | Age: 77
End: 2017-12-21

## 2017-12-21 RX ORDER — ASCORBIC ACID 500 MG
500 TABLET ORAL DAILY
COMMUNITY

## 2017-12-21 RX ORDER — VITAMIN B COMPLEX
1 TABLET ORAL DAILY
COMMUNITY

## 2017-12-21 RX ORDER — GINSENG 100 MG
50 CAPSULE ORAL DAILY
COMMUNITY

## 2017-12-21 RX ORDER — VITAMIN E 268 MG
400 CAPSULE ORAL DAILY
COMMUNITY

## 2017-12-21 NOTE — PROGRESS NOTES
Outbound call to Augustus for medication review. See completed medication review pharmacy follow-up [facility].     Ivet Bourgeois, PharmD

## 2018-01-29 ENCOUNTER — OFFICE VISIT (OUTPATIENT)
Dept: MEDICAL GROUP | Facility: MEDICAL CENTER | Age: 78
End: 2018-01-29
Payer: MEDICARE

## 2018-01-29 VITALS
DIASTOLIC BLOOD PRESSURE: 72 MMHG | OXYGEN SATURATION: 95 % | TEMPERATURE: 98.2 F | BODY MASS INDEX: 22.48 KG/M2 | HEART RATE: 71 BPM | WEIGHT: 157 LBS | HEIGHT: 70 IN | RESPIRATION RATE: 16 BRPM | SYSTOLIC BLOOD PRESSURE: 142 MMHG

## 2018-01-29 DIAGNOSIS — Z00.00 ROUTINE GENERAL MEDICAL EXAMINATION AT A HEALTH CARE FACILITY: ICD-10-CM

## 2018-01-29 DIAGNOSIS — F41.9 ANXIETY: ICD-10-CM

## 2018-01-29 DIAGNOSIS — E78.5 DYSLIPIDEMIA: ICD-10-CM

## 2018-01-29 DIAGNOSIS — M25.522 LEFT ELBOW PAIN: ICD-10-CM

## 2018-01-29 PROCEDURE — 99213 OFFICE O/P EST LOW 20 MIN: CPT | Performed by: NURSE PRACTITIONER

## 2018-01-29 RX ORDER — ALPRAZOLAM 0.25 MG/1
0.25 TABLET ORAL NIGHTLY PRN
Qty: 30 TAB | Refills: 3 | Status: SHIPPED | OUTPATIENT
Start: 2018-01-29 | End: 2018-02-28

## 2018-01-29 ASSESSMENT — ENCOUNTER SYMPTOMS: INSOMNIA: 1

## 2018-01-30 NOTE — PROGRESS NOTES
Subjective:      Augustus Richardson is a 77 y.o. male who presents with Referral Needed (physical therapy/ elbow arthritis to [facility])        CC: Patient is here today requesting physical therapy referral for left elbow pain as well as refills on Xanax.    HPI Augustus Richardson      1. Left elbow pain  Patient states that he has been hitting his left elbow on a door jam on and off for the past month. He has been going to physical therapy for his back and he was advised to follow up for his elbow. He states there has been no redness or swelling. The pain is mostly with flexion and extension. The pain is on the tip of the elbow. Symptoms are worse with movement and better with rest.    2. Dyslipidemia  Patient has history of elevation of LDL at 160 and total cholesterol at 227 but he has declined statins in the past.    3. Routine general medical examination at a health care facility  Patient will be due for routine lab work in June.    4. Anxiety  Patient will like to get refill on Xanax which she uses a few times per week at bedtime for sleep. PNP shows he has not been getting medicines from other offices and is not on any other controlled substances.  Social History   Substance Use Topics   • Smoking status: Never Smoker   • Smokeless tobacco: Never Used   • Alcohol use Yes      Comment: occas     Current Outpatient Prescriptions   Medication Sig Dispense Refill   • alprazolam (XANAX) 0.25 MG Tab Take 1 Tab by mouth at bedtime as needed for Sleep for up to 30 days. 30 Tab 3   • ascorbic acid (ASCORBIC ACID) 500 MG Tab Take 500 mg by mouth every day.     • Zinc 50 MG Tab Take 50 mg by mouth every day.     • TURMERIC PO Take 720 mg by mouth every day.     • vitamin e (VITAMIN E) 400 UNIT Cap Take 400 Units by mouth every day.     • Cholecalciferol (VITAMIN D3) 5000 units Tab Take 5,000 Units by mouth every day.     • B Complex Vitamins (VITAMIN B-COMPLEX) Tab Take 1 tablet by mouth every day.     • NON  "SPECIFIED        No current facility-administered medications for this visit.      Family History   Problem Relation Age of Onset   • Other Mother      unknown   • Cancer Father      pancreatic   History reviewed. No pertinent past medical history.    Review of Systems   Musculoskeletal: Positive for joint pain.   Psychiatric/Behavioral: The patient has insomnia.    All other systems reviewed and are negative.         Objective:     /72   Pulse 71   Temp 36.8 °C (98.2 °F)   Resp 16   Ht 1.778 m (5' 10\")   Wt 71.2 kg (157 lb)   SpO2 95%   BMI 22.53 kg/m²      Physical Exam   Constitutional: He is oriented to person, place, and time. He appears well-developed and well-nourished. No distress.   HENT:   Head: Normocephalic and atraumatic.   Right Ear: External ear normal.   Left Ear: External ear normal.   Nose: Nose normal.   Mouth/Throat: Oropharynx is clear and moist.   Eyes: Conjunctivae are normal. Right eye exhibits no discharge. Left eye exhibits no discharge.   Neck: Normal range of motion. Neck supple. No tracheal deviation present. No thyromegaly present.   Cardiovascular: Normal rate, regular rhythm and normal heart sounds.    No murmur heard.  Pulmonary/Chest: Effort normal and breath sounds normal. No respiratory distress. He has no wheezes. He has no rales.   Musculoskeletal:        Left elbow: He exhibits normal range of motion, no swelling and no effusion. Tenderness found.   Lymphadenopathy:     He has no cervical adenopathy.   Neurological: He is alert and oriented to person, place, and time. Coordination normal.   Skin: Skin is warm and dry. No rash noted. He is not diaphoretic. No erythema.   Psychiatric: He has a normal mood and affect. His behavior is normal. Judgment and thought content normal.   Nursing note and vitals reviewed.              Assessment/Plan:     1. Left elbow pain  I discussed options with patient including x-rays but he declined. He would like to see his physical " therapist and a referral was placed. I advised rest and Tylenol.  - REFERRAL TO PHYSICAL THERAPY Reason for Therapy: Eval/Treat/Report    2. Dyslipidemia  Patient may be a candidate for statin but has declined in the past. I recommended blood work in June.  - LIPID PROFILE; Future    3. Routine general medical examination at a health care facility  Lab work ordered but patient states he most likely will be moving back to [place] before his lab work is due in June.  - TSH; Future  - COMP METABOLIC PANEL; Future    4. Anxiety  I have refilled patient's medications with instructions that I will not be able to continue to provide a medicines once he has moved back to [place]. He does not appear to be abusing medicines or on any other controlled substances.  - alprazolam (XANAX) 0.25 MG Tab; Take 1 Tab by mouth at bedtime as needed for Sleep for up to 30 days.  Dispense: 30 Tab; Refill: 3

## 2018-02-05 ENCOUNTER — PATIENT OUTREACH (OUTPATIENT)
Dept: HEALTH INFORMATION MANAGEMENT | Facility: OTHER | Age: 78
End: 2018-02-05

## 2018-03-18 ENCOUNTER — RESOLUTE PROFESSIONAL BILLING HOSPITAL PROF FEE (OUTPATIENT)
Dept: HOSPITALIST | Facility: MEDICAL CENTER | Age: 78
End: 2018-03-18
Payer: MEDICARE

## 2018-03-18 ENCOUNTER — APPOINTMENT (OUTPATIENT)
Dept: RADIOLOGY | Facility: MEDICAL CENTER | Age: 78
End: 2018-03-18
Attending: EMERGENCY MEDICINE
Payer: MEDICARE

## 2018-03-18 ENCOUNTER — HOSPITAL ENCOUNTER (OUTPATIENT)
Facility: MEDICAL CENTER | Age: 78
End: 2018-03-19
Attending: EMERGENCY MEDICINE | Admitting: INTERNAL MEDICINE
Payer: MEDICARE

## 2018-03-18 DIAGNOSIS — G89.18 POSTOPERATIVE PAIN: Primary | ICD-10-CM

## 2018-03-18 DIAGNOSIS — S82.892A FRACTURE DISLOCATION OF ANKLE, LEFT, CLOSED, INITIAL ENCOUNTER: ICD-10-CM
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
## 2018-03-18 PROBLEM — S82.899A ANKLE FRACTURE: Status: ACTIVE | Noted: 2018-03-18

## 2018-03-18 PROBLEM — D72.829 LEUKOCYTOSIS: Status: ACTIVE | Noted: 2018-03-18

## 2018-03-18 LAB
ALBUMIN SERPL BCP-MCNC: 4 G/DL (ref 3.2–4.9)
ALBUMIN/GLOB SERPL: 1.8 G/DL
ALP SERPL-CCNC: 60 U/L (ref 30–99)
ALT SERPL-CCNC: 22 U/L (ref 2–50)
ANION GAP SERPL CALC-SCNC: 8 MMOL/L (ref 0–11.9)
APTT PPP: 24.3 SEC (ref 24.7–36)
AST SERPL-CCNC: 26 U/L (ref 12–45)
BASOPHILS # BLD AUTO: 0.2 % (ref 0–1.8)
BASOPHILS # BLD: 0.04 K/UL (ref 0–0.12)
BILIRUB SERPL-MCNC: 0.6 MG/DL (ref 0.1–1.5)
BUN SERPL-MCNC: 22 MG/DL (ref 8–22)
CALCIUM SERPL-MCNC: 9.3 MG/DL (ref 8.5–10.5)
CHLORIDE SERPL-SCNC: 108 MMOL/L (ref 96–112)
CO2 SERPL-SCNC: 23 MMOL/L (ref 20–33)
CREAT SERPL-MCNC: 1.26 MG/DL (ref 0.5–1.4)
EOSINOPHIL # BLD AUTO: 0 K/UL (ref 0–0.51)
EOSINOPHIL NFR BLD: 0 % (ref 0–6.9)
ERYTHROCYTE [DISTWIDTH] IN BLOOD BY AUTOMATED COUNT: 41.5 FL (ref 35.9–50)
GLOBULIN SER CALC-MCNC: 2.2 G/DL (ref 1.9–3.5)
GLUCOSE SERPL-MCNC: 98 MG/DL (ref 65–99)
HCT VFR BLD AUTO: 41.1 % (ref 42–52)
HGB BLD-MCNC: 14 G/DL (ref 14–18)
IMM GRANULOCYTES # BLD AUTO: 0.07 K/UL (ref 0–0.11)
IMM GRANULOCYTES NFR BLD AUTO: 0.4 % (ref 0–0.9)
INR PPP: 1.19 (ref 0.87–1.13)
LYMPHOCYTES # BLD AUTO: 0.78 K/UL (ref 1–4.8)
LYMPHOCYTES NFR BLD: 4.2 % (ref 22–41)
MCH RBC QN AUTO: 30.7 PG (ref 27–33)
MCHC RBC AUTO-ENTMCNC: 34.1 G/DL (ref 33.7–35.3)
MCV RBC AUTO: 90.1 FL (ref 81.4–97.8)
MONOCYTES # BLD AUTO: 0.61 K/UL (ref 0–0.85)
MONOCYTES NFR BLD AUTO: 3.3 % (ref 0–13.4)
NEUTROPHILS # BLD AUTO: 17.04 K/UL (ref 1.82–7.42)
NEUTROPHILS NFR BLD: 91.9 % (ref 44–72)
NRBC # BLD AUTO: 0 K/UL
NRBC BLD-RTO: 0 /100 WBC
PLATELET # BLD AUTO: 220 K/UL (ref 164–446)
PMV BLD AUTO: 10.3 FL (ref 9–12.9)
POTASSIUM SERPL-SCNC: 3.8 MMOL/L (ref 3.6–5.5)
PROT SERPL-MCNC: 6.2 G/DL (ref 6–8.2)
PROTHROMBIN TIME: 14.8 SEC (ref 12–14.6)
RBC # BLD AUTO: 4.56 M/UL (ref 4.7–6.1)
SODIUM SERPL-SCNC: 139 MMOL/L (ref 135–145)
WBC # BLD AUTO: 18.5 K/UL (ref 4.8–10.8)

## 2018-03-18 PROCEDURE — 96375 TX/PRO/DX INJ NEW DRUG ADDON: CPT

## 2018-03-18 PROCEDURE — 85730 THROMBOPLASTIN TIME PARTIAL: CPT

## 2018-03-18 PROCEDURE — 94760 N-INVAS EAR/PLS OXIMETRY 1: CPT

## 2018-03-18 PROCEDURE — 85610 PROTHROMBIN TIME: CPT

## 2018-03-18 PROCEDURE — 99220 PR INITIAL OBSERVATION CARE,LEVL III: CPT | Performed by: INTERNAL MEDICINE

## 2018-03-18 PROCEDURE — 71045 X-RAY EXAM CHEST 1 VIEW: CPT

## 2018-03-18 PROCEDURE — 700111 HCHG RX REV CODE 636 W/ 250 OVERRIDE (IP): Performed by: EMERGENCY MEDICINE

## 2018-03-18 PROCEDURE — 96374 THER/PROPH/DIAG INJ IV PUSH: CPT

## 2018-03-18 PROCEDURE — 700102 HCHG RX REV CODE 250 W/ 637 OVERRIDE(OP): Performed by: INTERNAL MEDICINE

## 2018-03-18 PROCEDURE — G0378 HOSPITAL OBSERVATION PER HR: HCPCS

## 2018-03-18 PROCEDURE — 85025 COMPLETE CBC W/AUTO DIFF WBC: CPT

## 2018-03-18 PROCEDURE — 99285 EMERGENCY DEPT VISIT HI MDM: CPT

## 2018-03-18 PROCEDURE — 93005 ELECTROCARDIOGRAM TRACING: CPT | Performed by: EMERGENCY MEDICINE

## 2018-03-18 PROCEDURE — 700105 HCHG RX REV CODE 258: Performed by: INTERNAL MEDICINE

## 2018-03-18 PROCEDURE — 73600 X-RAY EXAM OF ANKLE: CPT | Mod: LT

## 2018-03-18 PROCEDURE — 700111 HCHG RX REV CODE 636 W/ 250 OVERRIDE (IP): Performed by: INTERNAL MEDICINE

## 2018-03-18 PROCEDURE — 27840 TREAT ANKLE DISLOCATION: CPT

## 2018-03-18 PROCEDURE — 80053 COMPREHEN METABOLIC PANEL: CPT

## 2018-03-18 PROCEDURE — 73590 X-RAY EXAM OF LOWER LEG: CPT | Mod: LT

## 2018-03-18 PROCEDURE — 36415 COLL VENOUS BLD VENIPUNCTURE: CPT

## 2018-03-18 PROCEDURE — A9270 NON-COVERED ITEM OR SERVICE: HCPCS | Performed by: INTERNAL MEDICINE

## 2018-03-18 PROCEDURE — 700105 HCHG RX REV CODE 258: Performed by: EMERGENCY MEDICINE

## 2018-03-18 RX ORDER — ALPRAZOLAM 0.5 MG/1
0.5 TABLET ORAL NIGHTLY PRN
Status: DISCONTINUED | OUTPATIENT
Start: 2018-03-18 | End: 2018-03-19 | Stop reason: HOSPADM

## 2018-03-18 RX ORDER — KETOROLAC TROMETHAMINE 30 MG/ML
30 INJECTION, SOLUTION INTRAMUSCULAR; INTRAVENOUS EVERY 6 HOURS PRN
Status: DISCONTINUED | OUTPATIENT
Start: 2018-03-18 | End: 2018-03-19 | Stop reason: HOSPADM

## 2018-03-18 RX ORDER — MAGNESIUM OXIDE 400 MG/1
400 TABLET ORAL DAILY
COMMUNITY

## 2018-03-18 RX ORDER — OXYCODONE HYDROCHLORIDE 5 MG/1
5 TABLET ORAL EVERY 4 HOURS PRN
Status: DISCONTINUED | OUTPATIENT
Start: 2018-03-18 | End: 2018-03-19

## 2018-03-18 RX ORDER — ALPRAZOLAM 0.5 MG/1
0.5 TABLET ORAL NIGHTLY PRN
COMMUNITY

## 2018-03-18 RX ORDER — SODIUM CHLORIDE 9 MG/ML
INJECTION, SOLUTION INTRAVENOUS CONTINUOUS
Status: DISCONTINUED | OUTPATIENT
Start: 2018-03-18 | End: 2018-03-18

## 2018-03-18 RX ORDER — ACETAMINOPHEN 325 MG/1
650 TABLET ORAL EVERY 6 HOURS PRN
Status: DISCONTINUED | OUTPATIENT
Start: 2018-03-18 | End: 2018-03-19 | Stop reason: HOSPADM

## 2018-03-18 RX ORDER — SODIUM CHLORIDE 9 MG/ML
INJECTION, SOLUTION INTRAVENOUS CONTINUOUS
Status: DISPENSED | OUTPATIENT
Start: 2018-03-18 | End: 2018-03-19

## 2018-03-18 RX ORDER — ONDANSETRON 2 MG/ML
4 INJECTION INTRAMUSCULAR; INTRAVENOUS EVERY 4 HOURS PRN
Status: DISCONTINUED | OUTPATIENT
Start: 2018-03-18 | End: 2018-03-19 | Stop reason: HOSPADM

## 2018-03-18 RX ORDER — ONDANSETRON 4 MG/1
4 TABLET, ORALLY DISINTEGRATING ORAL EVERY 4 HOURS PRN
Status: DISCONTINUED | OUTPATIENT
Start: 2018-03-18 | End: 2018-03-19 | Stop reason: HOSPADM

## 2018-03-18 RX ORDER — ASCORBIC ACID 500 MG
500 TABLET ORAL DAILY
Status: DISCONTINUED | OUTPATIENT
Start: 2018-03-19 | End: 2018-03-19 | Stop reason: HOSPADM

## 2018-03-18 RX ORDER — BISACODYL 10 MG
10 SUPPOSITORY, RECTAL RECTAL
Status: DISCONTINUED | OUTPATIENT
Start: 2018-03-18 | End: 2018-03-19 | Stop reason: HOSPADM

## 2018-03-18 RX ORDER — POLYETHYLENE GLYCOL 3350 17 G/17G
1 POWDER, FOR SOLUTION ORAL
Status: DISCONTINUED | OUTPATIENT
Start: 2018-03-18 | End: 2018-03-19 | Stop reason: HOSPADM

## 2018-03-18 RX ORDER — AMOXICILLIN 250 MG
2 CAPSULE ORAL 2 TIMES DAILY
Status: DISCONTINUED | OUTPATIENT
Start: 2018-03-18 | End: 2018-03-19 | Stop reason: HOSPADM

## 2018-03-18 RX ADMIN — PROPOFOL 50 MG: 10 INJECTION, EMULSION INTRAVENOUS at 10:13

## 2018-03-18 RX ADMIN — KETOROLAC TROMETHAMINE 30 MG: 30 INJECTION, SOLUTION INTRAMUSCULAR; INTRAVENOUS at 20:40

## 2018-03-18 RX ADMIN — OXYCODONE HYDROCHLORIDE 5 MG: 5 TABLET ORAL at 16:28

## 2018-03-18 RX ADMIN — FENTANYL CITRATE 50 MCG: 50 INJECTION INTRAMUSCULAR; INTRAVENOUS at 11:11

## 2018-03-18 RX ADMIN — ALPRAZOLAM 0.5 MG: 0.5 TABLET ORAL at 22:29

## 2018-03-18 RX ADMIN — PROPOFOL 100 MG: 10 INJECTION, EMULSION INTRAVENOUS at 11:10

## 2018-03-18 RX ADMIN — SODIUM CHLORIDE: 9 INJECTION, SOLUTION INTRAVENOUS at 16:29

## 2018-03-18 RX ADMIN — OXYCODONE HYDROCHLORIDE 5 MG: 5 TABLET ORAL at 20:40

## 2018-03-18 RX ADMIN — ACETAMINOPHEN 650 MG: 325 TABLET, FILM COATED ORAL at 22:29

## 2018-03-18 RX ADMIN — SODIUM CHLORIDE: 9 INJECTION, SOLUTION INTRAVENOUS at 10:46

## 2018-03-18 ASSESSMENT — PATIENT HEALTH QUESTIONNAIRE - PHQ9
SUM OF ALL RESPONSES TO PHQ9 QUESTIONS 1 AND 2: 0
2. FEELING DOWN, DEPRESSED, IRRITABLE, OR HOPELESS: NOT AT ALL
SUM OF ALL RESPONSES TO PHQ QUESTIONS 1-9: 0
1. LITTLE INTEREST OR PLEASURE IN DOING THINGS: NOT AT ALL

## 2018-03-18 ASSESSMENT — PAIN SCALES - GENERAL
PAINLEVEL_OUTOF10: 10
PAINLEVEL_OUTOF10: 10
PAINLEVEL_OUTOF10: 4
PAINLEVEL_OUTOF10: 7
PAINLEVEL_OUTOF10: 9
PAINLEVEL_OUTOF10: 9

## 2018-03-18 ASSESSMENT — ENCOUNTER SYMPTOMS
ABDOMINAL PAIN: 0
VOMITING: 0
PALPITATIONS: 0
SHORTNESS OF BREATH: 0
DEPRESSION: 0
WEIGHT LOSS: 0
NECK PAIN: 0
SPUTUM PRODUCTION: 0
LOSS OF CONSCIOUSNESS: 0
FOCAL WEAKNESS: 0
HEMOPTYSIS: 0
DIZZINESS: 0
WEAKNESS: 1
BLURRED VISION: 0
CHILLS: 0
FEVER: 0
NAUSEA: 0
MYALGIAS: 0

## 2018-03-18 ASSESSMENT — COPD QUESTIONNAIRES
COPD SCREENING SCORE: 0
DURING THE PAST 4 WEEKS HOW MUCH DID YOU FEEL SHORT OF BREATH: NONE/LITTLE OF THE TIME
DURING THE PAST 4 WEEKS HOW MUCH DID YOU FEEL SHORT OF BREATH: NONE/LITTLE OF THE TIME
COPD SCREENING SCORE: 2
DO YOU EVER COUGH UP ANY MUCUS OR PHLEGM?: NO/ONLY WITH OCCASIONAL COLDS OR INFECTIONS
HAVE YOU SMOKED AT LEAST 100 CIGARETTES IN YOUR ENTIRE LIFE: NO/DON'T KNOW
HAVE YOU SMOKED AT LEAST 100 CIGARETTES IN YOUR ENTIRE LIFE: NO/DON'T KNOW
DO YOU EVER COUGH UP ANY MUCUS OR PHLEGM?: NO/ONLY WITH OCCASIONAL COLDS OR INFECTIONS

## 2018-03-18 ASSESSMENT — LIFESTYLE VARIABLES
EVER_SMOKED: NEVER
EVER_SMOKED: NEVER
ALCOHOL_USE: NO

## 2018-03-18 NOTE — ED NOTES
Informed consent obtained for reduction of left ankle. Procedure explained by ERP and all questions addressed. States he understands procedure.   Imaging at bedside.

## 2018-03-18 NOTE — ED PROVIDER NOTES
"ED Provider Note    CHIEF COMPLAINT  Chief Complaint   Patient presents with   • T-5000 Ankle Injury     Left ankle deformity.  CMS intact, +3 pedal pulse.    • T-5000 GLF     Pt reports he was taking out the trash and slipped and fell on ice.  Negative LOC.  Positive left ankle pain and deformity.        HPI  Augustus Richadrson is a 77 y.o. male who presents to the emergency department by ambulance for left ankle pain and deformity after mechanical ground-level slip and fall on ice will taking the trash out this morning. Patient states \"I put it back in place 3 times but he keeps moving again.\" Pain, sharp, throbbing, 5 out of 10 improved with medication by EMS. Patient denies knee pain, hip pain, neck pain or back pain. Denies head injury or loss of consciousness.    REVIEW OF SYSTEMS  See HPI for further details.     PAST MEDICAL HISTORY   denies    SOCIAL HISTORY  Social History     Social History Main Topics   • Smoking status: Never Smoker   • Smokeless tobacco: Never Used   • Alcohol use Yes      Comment: occas   • Drug use: No   • Sexual activity: Not on file       SURGICAL HISTORY  patient denies any surgical history    CURRENT MEDICATIONS  Home Medications     Reviewed by Michell Leyva R.N. (Registered Nurse) on 03/18/18 at 1012  Med List Status: Complete   Medication Last Dose Status   ascorbic acid (ASCORBIC ACID) 500 MG Tab  Active   B Complex Vitamins (VITAMIN B-COMPLEX) Tab  Active   Cholecalciferol (VITAMIN D3) 5000 units Tab  Active   NON SPECIFIED  Active   TURMERIC PO  Active   vitamin e (VITAMIN E) 400 UNIT Cap  Active   Zinc 50 MG Tab  Active                ALLERGIES  No Known Allergies    PHYSICAL EXAM  VITAL SIGNS: /84   Pulse 91   Temp 37.7 °C (99.8 °F)   Resp (!) 25   Ht 1.778 m (5' 10\")   Wt 69.9 kg (154 lb)   SpO2 96%   BMI 22.10 kg/m²   Pulse ox interpretation: I interpret this pulse ox as normal.  Constitutional: Alert in no apparent distress.  HENT: Normocephalic, " atraumatic, no cephalohematoma. Bilateral external ears normal, Nose normal. Moist mucous membranes.  No oral trauma.  Eyes: Pupils are equal and reactive, Conjunctiva normal.   Neck: No midline tenderness palpation, no step-offs. Full range of motion without pain resistance.. .   Cardiovascular: Normal peripheral perfusion.  Thorax & Lungs: Nonlabored respirations. No chest wall tenderness, abrasion, hematoma or crepitus.  Abdomen: Soft, non-distended, non-tender to palpation.   Skin: Warm, Dry.  Musculoskeletal: Gross left ankle deformity with tenting of skin. Superficial abrasion without open wound. Moves toes without difficulty, 2+ DP, sensation intact to light touch. No tenderness at fibular head, hip. Pelvis stable.  Neurologic: Alert , no gross focal deficit noted.  Psychiatric: Affect normal, Judgment normal, Mood normal.       DIAGNOSTIC STUDIES / PROCEDURES    LABS  Pending - preop    RADIOLOGY  DX-ANKLE 2- VIEWS LEFT   Final Result      Interval reduction of previously noted ankle dislocation.      Trimalleolar ankle fracture.      Soft tissue swelling.      DX-ANKLE 2- VIEWS LEFT   Final Result      Left ankle fracture/dislocation.      DX-TIBIA AND FIBULA LEFT   Final Result      Left ankle fracture/dislocation.      DX-CHEST-PORTABLE (1 VIEW)    (Results Pending)       PROCEDURE  CONSCIOUS SEDATION PROCEDURE NOTE:  Patient identification was confirmed and patient consent was obtain verbally.  The procedure was conducted at 1120 and performed by Dr. Tian.  Anesthetic used: Propofol  Length of Time: See nurse's notes  Other medications administered: Fentanyl  Pre-procedure neuro examination revealed no deficits. Patient's airway remained patent, O2SAT adequate through procedure. Vitals remained stable. Patient tolerated procedure well without complications. Post-procedure exam showed patient w/o cranial deficits. Sensory and motor intact. Patient returned to baseline prior to disposition.  Instructions  for care discussed verbally and pt provided with additional written instructions for homecare and f/u.    REDUCTION PROCEDURE NOTE:  Patient identification was confirmed, consent was obtained verbally.  This procedure was performed at 1120 by Dr. Tian.  Site: Left ankle  Anesthetic used (type and amt): Propofol  Pre-procedure N/V exam: Intact  # of attempts: One  Type of splint: 3 sided, stirrup and posterior short leg  Pt anesthetized, fx/dislocation reduced successfully. Patient tolerated procedure well without complications. Patient splinted. Post-procedure exam indicates patient is n/v intact distal to the injury site. Post-procedure films show excellent alignment. Patient  returned to baseline prior to disposition. Instructions for care discussed verbally and patient provided with additional written instructions for homecare and f/u.    SPLINT PLACEMENT:  The patient was placed in a 3 sided, stirrup plus posterior short leg splint and the splint was applied by myself and ER Tech. Following splint application I rechecked the position and circulation and neuro function. The splint was in good position with good neurovascular function. The joint was well immobilized.      COURSE & MEDICAL DECISION MAKING  Left ankle fracture dislocation reduced as described above under conscious sedation. 3 sided splint applied. CMS intact distally following the procedure. Patient tolerated sedation procedure well and without difficulty. Reduction x-ray with interval reduction and evidence for trimalleolar ankle fracture.    12:00 PM Orthopedics paged.    12:28 PM Ortho PA contacted.  Will give information to Dr. Reynoso and have patient seen at bedside.    12:33 PM Dr. Reynoso is aware the patient and will see him at bedside.e    12:44 PM Dr. Reynoso has seen the patient at bedside and will plan on a tomorrow. Admit to hospitalist. Nothing by mouth after midnight. Hospitalist paged.    12:46 PM Dr. Vega is aware of the patient  and agreeable to admission.    Left ankle fracture dislocation status post reduction as described above. Patient has been splinted with CMS intact following the procedure. Patient has been seen by orthopedics, plan OR tomorrow, will be admitted to the hospitalist, preop x-ray, EKG and labs are been ordered. She does wear the findings and agreeable to disposition planning.    FINAL IMPRESSION  (S82.485D) Fracture dislocation of ankle, left, closed, initial encounter  (primary encounter diagnosis)      Electronically signed by: Florence Tian, 3/18/2018 10:36 AM      This dictation was created using voice recognition software. The accuracy of the dictation is limited to the abilities of the software. I expect there may be some errors of grammar and possibly content. The nursing notes were reviewed and certain aspects of this information were incorporated into this note.

## 2018-03-18 NOTE — ED TRIAGE NOTES
Chief Complaint   Patient presents with   • T-5000 Ankle Injury     Left ankle deformity.  CMS intact, +3 pedal pulse.    • T-5000 GLF     Pt reports he was taking out the trash and slipped and fell on ice.  Negative LOC.  Positive left ankle pain and deformity.    Pt bib [facility] for above chief complaint.  Pt medicated with 100 mcg Fentanyl pta.

## 2018-03-18 NOTE — CONSULTS
DATE OF SERVICE:  03/18/2018    HISTORY OF PRESENT ILLNESS:  I was asked to see the patient by the emergency   room physician.  The patient was preparing to leave this date today when he   slipped on ice and noticed an external rotation movement about his ankle   leaving him with a resultant deformity and inability to walk on that leg.    PHYSICAL EXAMINATION:  My physical exam is quite limited by pain at this time;   however, he is grossly neurovascularly intact, and his skin is clean, dry,   and intact about the ankle.  He has a moderate but expected amount of   swelling.  Range of motion about the knee is normal and preserved.  He does   not have any pain of the tibia and he has a negative squeeze test.    DIAGNOSTIC DATA:  Review of the images demonstrates a trimalleolar ankle   fracture, which has been reduced by the emergency room physician.    IMPRESSION:  Left trimalleolar ankle fracture.    ASSESSMENT AND PLAN:  The patient is in quite a bit of pain.  I think it is   very reasonable to admit the patient for pain control and consider an   operation tomorrow.  We will admit to medical service and I will discuss this   with my colleagues, who will be happy to fix this tomorrow in the operating   room.  I would ask the medical service please provide risk stratification and   medical management for this patient. Please do not hesitate to contact   orthopedic service if there are any further questions regarding this patient   in the meantime, [phone]. He should be kept nonweightbearing on that   side, preferably bed rest with bathroom privileges.       ____________________________________     MD CHANCE Jenkins / LORRAINE    DD:  03/18/2018 12:45:57  DT:  03/18/2018 13:35:06    D#:  [id number]  Job#:  [id number]

## 2018-03-18 NOTE — ED NOTES
Abrasion noted to medial left ankle at site of fracture. Trauma criteria discussed with charge RN. Per charge, pt will not be a trauma.

## 2018-03-18 NOTE — ED NOTES
Report to Margaret BOLAÑOS. Updated on test results, POC,a nd stable VS. All questions addressed.

## 2018-03-18 NOTE — RESPIRATORY CARE
Conscious Sedation Respiratory Update       O2 (LPM): Pt required 4L NC throughout the majority of the procedure, and BVM for roughly 30. SpO2 down to 85%, back up to the 90s with stimulation and minimal BVM support. Pt primarily was breathing via his mouth so EtCO2 reading variable.

## 2018-03-19 ENCOUNTER — APPOINTMENT (OUTPATIENT)
Dept: RADIOLOGY | Facility: MEDICAL CENTER | Age: 78
End: 2018-03-19
Attending: ORTHOPAEDIC SURGERY
Payer: MEDICARE

## 2018-03-19 VITALS
HEIGHT: 70 IN | HEART RATE: 101 BPM | DIASTOLIC BLOOD PRESSURE: 70 MMHG | OXYGEN SATURATION: 94 % | WEIGHT: 154 LBS | SYSTOLIC BLOOD PRESSURE: 126 MMHG | BODY MASS INDEX: 22.05 KG/M2 | TEMPERATURE: 97.8 F | RESPIRATION RATE: 16 BRPM

## 2018-03-19 LAB
ANION GAP SERPL CALC-SCNC: 6 MMOL/L (ref 0–11.9)
BASOPHILS # BLD AUTO: 0.2 % (ref 0–1.8)
BASOPHILS # BLD: 0.02 K/UL (ref 0–0.12)
BUN SERPL-MCNC: 21 MG/DL (ref 8–22)
CALCIUM SERPL-MCNC: 9.1 MG/DL (ref 8.5–10.5)
CHLORIDE SERPL-SCNC: 111 MMOL/L (ref 96–112)
CO2 SERPL-SCNC: 22 MMOL/L (ref 20–33)
CREAT SERPL-MCNC: 1.15 MG/DL (ref 0.5–1.4)
EOSINOPHIL # BLD AUTO: 0.01 K/UL (ref 0–0.51)
EOSINOPHIL NFR BLD: 0.1 % (ref 0–6.9)
ERYTHROCYTE [DISTWIDTH] IN BLOOD BY AUTOMATED COUNT: 43.2 FL (ref 35.9–50)
GLUCOSE SERPL-MCNC: 102 MG/DL (ref 65–99)
HCT VFR BLD AUTO: 39 % (ref 42–52)
HGB BLD-MCNC: 13.3 G/DL (ref 14–18)
IMM GRANULOCYTES # BLD AUTO: 0.02 K/UL (ref 0–0.11)
IMM GRANULOCYTES NFR BLD AUTO: 0.2 % (ref 0–0.9)
LYMPHOCYTES # BLD AUTO: 1.81 K/UL (ref 1–4.8)
LYMPHOCYTES NFR BLD: 18.9 % (ref 22–41)
MCH RBC QN AUTO: 31.4 PG (ref 27–33)
MCHC RBC AUTO-ENTMCNC: 34.1 G/DL (ref 33.7–35.3)
MCV RBC AUTO: 92.2 FL (ref 81.4–97.8)
MONOCYTES # BLD AUTO: 1.06 K/UL (ref 0–0.85)
MONOCYTES NFR BLD AUTO: 11 % (ref 0–13.4)
NEUTROPHILS # BLD AUTO: 6.68 K/UL (ref 1.82–7.42)
NEUTROPHILS NFR BLD: 69.6 % (ref 44–72)
NRBC # BLD AUTO: 0 K/UL
NRBC BLD-RTO: 0 /100 WBC
PLATELET # BLD AUTO: 207 K/UL (ref 164–446)
PMV BLD AUTO: 10.6 FL (ref 9–12.9)
POTASSIUM SERPL-SCNC: 4.1 MMOL/L (ref 3.6–5.5)
RBC # BLD AUTO: 4.23 M/UL (ref 4.7–6.1)
SODIUM SERPL-SCNC: 139 MMOL/L (ref 135–145)
WBC # BLD AUTO: 9.6 K/UL (ref 4.8–10.8)

## 2018-03-19 PROCEDURE — A9270 NON-COVERED ITEM OR SERVICE: HCPCS

## 2018-03-19 PROCEDURE — 700111 HCHG RX REV CODE 636 W/ 250 OVERRIDE (IP): Performed by: ORTHOPAEDIC SURGERY

## 2018-03-19 PROCEDURE — 500881 HCHG PACK, EXTREMITY: Performed by: ORTHOPAEDIC SURGERY

## 2018-03-19 PROCEDURE — A9270 NON-COVERED ITEM OR SERVICE: HCPCS | Performed by: INTERNAL MEDICINE

## 2018-03-19 PROCEDURE — 700101 HCHG RX REV CODE 250

## 2018-03-19 PROCEDURE — 500054 HCHG BANDAGE, ELASTIC 6: Performed by: ORTHOPAEDIC SURGERY

## 2018-03-19 PROCEDURE — G8987 SELF CARE CURRENT STATUS: HCPCS | Mod: CJ

## 2018-03-19 PROCEDURE — 700111 HCHG RX REV CODE 636 W/ 250 OVERRIDE (IP)

## 2018-03-19 PROCEDURE — G8988 SELF CARE GOAL STATUS: HCPCS | Mod: CI

## 2018-03-19 PROCEDURE — 160035 HCHG PACU - 1ST 60 MINS PHASE I: Performed by: ORTHOPAEDIC SURGERY

## 2018-03-19 PROCEDURE — 51798 US URINE CAPACITY MEASURE: CPT

## 2018-03-19 PROCEDURE — G8978 MOBILITY CURRENT STATUS: HCPCS | Mod: CI

## 2018-03-19 PROCEDURE — C1713 ANCHOR/SCREW BN/BN,TIS/BN: HCPCS | Performed by: ORTHOPAEDIC SURGERY

## 2018-03-19 PROCEDURE — 700102 HCHG RX REV CODE 250 W/ 637 OVERRIDE(OP)

## 2018-03-19 PROCEDURE — 700102 HCHG RX REV CODE 250 W/ 637 OVERRIDE(OP): Performed by: INTERNAL MEDICINE

## 2018-03-19 PROCEDURE — 700102 HCHG RX REV CODE 250 W/ 637 OVERRIDE(OP): Performed by: ORTHOPAEDIC SURGERY

## 2018-03-19 PROCEDURE — 85025 COMPLETE CBC W/AUTO DIFF WBC: CPT

## 2018-03-19 PROCEDURE — 160048 HCHG OR STATISTICAL LEVEL 1-5: Performed by: ORTHOPAEDIC SURGERY

## 2018-03-19 PROCEDURE — G0378 HOSPITAL OBSERVATION PER HR: HCPCS

## 2018-03-19 PROCEDURE — 160036 HCHG PACU - EA ADDL 30 MINS PHASE I: Performed by: ORTHOPAEDIC SURGERY

## 2018-03-19 PROCEDURE — 36415 COLL VENOUS BLD VENIPUNCTURE: CPT

## 2018-03-19 PROCEDURE — 160041 HCHG SURGERY MINUTES - EA ADDL 1 MIN LEVEL 4: Performed by: ORTHOPAEDIC SURGERY

## 2018-03-19 PROCEDURE — 73600 X-RAY EXAM OF ANKLE: CPT | Mod: LT

## 2018-03-19 PROCEDURE — 160002 HCHG RECOVERY MINUTES (STAT): Performed by: ORTHOPAEDIC SURGERY

## 2018-03-19 PROCEDURE — 97162 PT EVAL MOD COMPLEX 30 MIN: CPT

## 2018-03-19 PROCEDURE — 700111 HCHG RX REV CODE 636 W/ 250 OVERRIDE (IP): Performed by: INTERNAL MEDICINE

## 2018-03-19 PROCEDURE — 160029 HCHG SURGERY MINUTES - 1ST 30 MINS LEVEL 4: Performed by: ORTHOPAEDIC SURGERY

## 2018-03-19 PROCEDURE — 97166 OT EVAL MOD COMPLEX 45 MIN: CPT

## 2018-03-19 PROCEDURE — 80048 BASIC METABOLIC PNL TOTAL CA: CPT

## 2018-03-19 PROCEDURE — G8979 MOBILITY GOAL STATUS: HCPCS | Mod: CI

## 2018-03-19 PROCEDURE — 99217 PR OBSERVATION CARE DISCHARGE: CPT | Performed by: HOSPITALIST

## 2018-03-19 PROCEDURE — A9270 NON-COVERED ITEM OR SERVICE: HCPCS | Performed by: ORTHOPAEDIC SURGERY

## 2018-03-19 PROCEDURE — 501838 HCHG SUTURE GENERAL: Performed by: ORTHOPAEDIC SURGERY

## 2018-03-19 PROCEDURE — G8980 MOBILITY D/C STATUS: HCPCS | Mod: CI

## 2018-03-19 PROCEDURE — 160009 HCHG ANES TIME/MIN: Performed by: ORTHOPAEDIC SURGERY

## 2018-03-19 DEVICE — SCREW 2.5 MM LOCKING TI X 10MM LONG (6TX8=48): Type: IMPLANTABLE DEVICE | Status: FUNCTIONAL

## 2018-03-19 DEVICE — SCREW 2.5 MM LOCKING TI X 14MM LONG (6TX8=48): Type: IMPLANTABLE DEVICE | Status: FUNCTIONAL

## 2018-03-19 DEVICE — SCREW 3.5 MM LOCKING TI X 12MM LONG (6TX8+2TX5=58): Type: IMPLANTABLE DEVICE | Status: FUNCTIONAL

## 2018-03-19 DEVICE — SCREW CANN 4.0X40 SHORT OIC (3TX3+2TX2=13): Type: IMPLANTABLE DEVICE | Status: FUNCTIONAL

## 2018-03-19 DEVICE — SCREW 2.5 MM NON-LOCKING TI X 16MM LONG (6TX8=48): Type: IMPLANTABLE DEVICE | Status: FUNCTIONAL

## 2018-03-19 DEVICE — SCREW 3.5 MM NON-LOCKING TI X 12MM LONG (6TX8+2TX5=58): Type: IMPLANTABLE DEVICE | Status: FUNCTIONAL

## 2018-03-19 DEVICE — PLATE DISTAL FIBULA 5H (2TX2+2TX1=6): Type: IMPLANTABLE DEVICE | Status: FUNCTIONAL

## 2018-03-19 RX ORDER — OXYCODONE HCL 5 MG/5 ML
SOLUTION, ORAL ORAL
Status: COMPLETED
Start: 2018-03-19 | End: 2018-03-19

## 2018-03-19 RX ORDER — OXYCODONE HYDROCHLORIDE 5 MG/1
5 TABLET ORAL
Status: DISCONTINUED | OUTPATIENT
Start: 2018-03-19 | End: 2018-03-19 | Stop reason: HOSPADM

## 2018-03-19 RX ORDER — OXYCODONE HYDROCHLORIDE 5 MG/1
5 TABLET ORAL
Qty: 30 TAB | Refills: 0 | Status: SHIPPED | OUTPATIENT
Start: 2018-03-19 | End: 2018-03-24

## 2018-03-19 RX ADMIN — OXYCODONE HYDROCHLORIDE 5 MG: 5 SOLUTION ORAL at 12:52

## 2018-03-19 RX ADMIN — HYDROMORPHONE HYDROCHLORIDE 1 MG: 10 INJECTION, SOLUTION INTRAMUSCULAR; INTRAVENOUS; SUBCUTANEOUS at 02:55

## 2018-03-19 RX ADMIN — OXYCODONE HYDROCHLORIDE 5 MG: 5 TABLET ORAL at 01:35

## 2018-03-19 RX ADMIN — OXYCODONE HYDROCHLORIDE 5 MG: 5 TABLET ORAL at 05:18

## 2018-03-19 RX ADMIN — KETOROLAC TROMETHAMINE 30 MG: 30 INJECTION, SOLUTION INTRAMUSCULAR; INTRAVENOUS at 05:18

## 2018-03-19 ASSESSMENT — COGNITIVE AND FUNCTIONAL STATUS - GENERAL
MOVING TO AND FROM BED TO CHAIR: A LITTLE
HELP NEEDED FOR BATHING: A LITTLE
DAILY ACTIVITIY SCORE: 22
MOVING FROM LYING ON BACK TO SITTING ON SIDE OF FLAT BED: A LITTLE
DRESSING REGULAR LOWER BODY CLOTHING: A LITTLE
WALKING IN HOSPITAL ROOM: A LITTLE
SUGGESTED CMS G CODE MODIFIER DAILY ACTIVITY: CJ
SUGGESTED CMS G CODE MODIFIER MOBILITY: CK
TURNING FROM BACK TO SIDE WHILE IN FLAT BAD: A LITTLE
STANDING UP FROM CHAIR USING ARMS: A LITTLE
MOBILITY SCORE: 18
CLIMB 3 TO 5 STEPS WITH RAILING: A LITTLE

## 2018-03-19 ASSESSMENT — PAIN SCALES - GENERAL
PAINLEVEL_OUTOF10: ASSUMED PAIN PRESENT
PAINLEVEL_OUTOF10: 0
PAINLEVEL_OUTOF10: 0
PAINLEVEL_OUTOF10: 3
PAINLEVEL_OUTOF10: 8
PAINLEVEL_OUTOF10: 4
PAINLEVEL_OUTOF10: 4
PAINLEVEL_OUTOF10: 9
PAINLEVEL_OUTOF10: 4
PAINLEVEL_OUTOF10: 4
PAINLEVEL_OUTOF10: 9
PAINLEVEL_OUTOF10: 0
PAINLEVEL_OUTOF10: 7

## 2018-03-19 ASSESSMENT — GAIT ASSESSMENTS
DISTANCE (FEET): 100
ASSISTIVE DEVICE: FRONT WHEEL WALKER
GAIT LEVEL OF ASSIST: STAND BY ASSIST

## 2018-03-19 ASSESSMENT — ACTIVITIES OF DAILY LIVING (ADL): TOILETING: INDEPENDENT

## 2018-03-19 NOTE — OP REPORT
DATE OF SERVICE:  03/19/2018    PREOPERATIVE DIAGNOSIS:  Trimalleolar left ankle fracture.    POSTOPERATIVE DIAGNOSIS:  Trimalleolar left ankle fracture.    PROCEDURE:  Open reduction and internal fixation, left trimalleolar ankle   fracture without fixation of posterior lip.    SURGEON:  Pedro Pablo Yates MD    ASSISTANT:  Yury Huffman MD    ESTIMATED BLOOD LOSS:  Minimal.    INDICATIONS:  This is a 77-year-old male status post left trimalleolar ankle   fracture.  Risks and benefits of open reduction and internal fixation were   discussed at length, which include but not limited to bleeding, infection,   neurovascular damage, pain, stiffness, malunion, nonunion, DVT, PE, MI,   stroke, and death.  They understand all these risks and wished to proceed.    DESCRIPTION OF PROCEDURE:  Patient was sedated with LMA anesthesia and   administered preoperative antibiotics.  Left lower extremity was prepped and   draped in usual sterile fashion and a standard lateral approach to the fibula   was performed with care taken to avoid all neurovascular structures.  The   fracture was reduced in anatomic position and held the main fragment lag screw   followed by an [facility] distal fibular locking plate with a combination of locking   and nonlocking fixation.  The wound was irrigated, closed with 2-0 Vicryl   suture and staples.  Attention was then turned to the medial malleolus, which   was reduced via percutaneous single screw given the soft tissue injury to   avoid additional trauma.  The wound was closed with staples.  The posterior   malleolus reduced to anatomic position.  As a result, no fixation was placed   in this.  Sterile dressings were applied.  A well-padded short leg splint was   applied.  Patient tolerated the procedure well.    POSTOPERATIVE PLAN:  This patient to be toe-touch weightbearing, discharged   home.  Follow up in 2 weeks' time for a wound check and repeat x-rays.        ____________________________________     MD KATIA WEBER    DD:  03/19/2018 10:48:34  DT:  03/19/2018 12:20:24    D#:  [id number]  Job#:  [id number]

## 2018-03-19 NOTE — ASSESSMENT & PLAN NOTE
-mechanical in nature while on ice, no LOC, no need for cardiac work up at this time, 12-lead EKG is normal

## 2018-03-19 NOTE — H&P
HOSPITAL MEDICINE HISTORY/ PHYSICAL    Date of Service:  3/18/2018   8:53 PM       Patient ID:   Name: Augustus Richardson. YOB: 1940. Age: 77 y.o. male. MRN: [id number]    Admitting Attending:  Luc Vega     PCP : JIL Garcia          Chief Complaint:       Fell on the ice and broke my ankle    History of Present Illness:    Dylan is a 77 y.o. male w/h/o various medical problems who presents with above CC. Patient states he was in his usual state of health and was moving boxes to a moving truck this AM when he slipped on some ice and rolled over his L ankle. When he tried to get up he realized that his foot was moving in the opposite direction of his leg with no open bones. He had intense pain, but no weakness or numbness. He was brought to the ER where he underwent local reduction of the dislocation under propofol and pain is much better now. Denies any LOC. No new recent OTC or Rx medications. Otherwise, feels quite healthy.    Review of Systems:    Has Review of Systems   Constitutional: Negative for chills, fever and weight loss.   HENT: Negative for hearing loss.    Eyes: Negative for blurred vision.   Respiratory: Negative for hemoptysis, sputum production and shortness of breath.    Cardiovascular: Positive for leg swelling. Negative for chest pain and palpitations.   Gastrointestinal: Negative for abdominal pain, nausea and vomiting.   Genitourinary: Negative for dysuria.   Musculoskeletal: Positive for joint pain. Negative for myalgias and neck pain.   Skin: Negative for itching.   Neurological: Positive for weakness. Negative for dizziness, focal weakness and loss of consciousness.   Psychiatric/Behavioral: Negative for depression.   All other systems reviewed and are negative.    Please see HPI, all other systems were reviewed and are negative (AMA/CMS criteria)              Past Medical/ Family / Social history (PFSH):   Past medical history  -Anxiety, OA    Past  "Surgical Hx  -none    Current Outpatient Medications:  No current facility-administered medications on file prior to encounter.      Current Outpatient Prescriptions on File Prior to Encounter   Medication Sig Dispense Refill   • ascorbic acid (ASCORBIC ACID) 500 MG Tab Take 500 mg by mouth every day.     • Zinc 50 MG Tab Take 50 mg by mouth every day.     • TURMERIC PO Take 720 mg by mouth every day.     • vitamin e (VITAMIN E) 400 UNIT Cap Take 400 Units by mouth every day.     • Cholecalciferol (VITAMIN D3) 5000 units Tab Take 5,000 Units by mouth every day.     • B Complex Vitamins (VITAMIN B-COMPLEX) Tab Take 1 tablet by mouth every day.         Medication Allergy/Sensitivities:  No Known Allergies    Family History:  Family History   Problem Relation Age of Onset   • Other Mother      unknown   • Cancer Father      pancreatic      Family History   Problem Relation Age of Onset   • Other Mother      unknown   • Cancer Father      pancreatic       Social History:  Social History   Substance Use Topics   • Smoking status: Never Smoker   • Smokeless tobacco: Never Used   • Alcohol use Yes      Comment: occas     #################################################################  Physical Exam:   Vitals/ General Appearance:   Weight/BMI: Body mass index is 22.1 kg/m².  Blood pressure 144/73, pulse 66, temperature 37.2 °C (98.9 °F), resp. rate 18, height 1.778 m (5' 10\"), weight 69.9 kg (154 lb), SpO2 99 %.   Vitals:    03/18/18 1230 03/18/18 1345 03/18/18 1545 03/18/18 1623   BP:    144/73   Pulse: 67  77 66   Resp: 16 17 (!) 23 18   Temp:    37.2 °C (98.9 °F)   SpO2: 95% 96% 100% 99%   Weight:       Height:        Oxygen Therapy:  Pulse Oximetry: 99 %, O2 (LPM): 0, O2 Delivery: None (Room Air)    Constitutional:  well developed, well nourished, non-toxic, no acute distress  HENMT: Normocephalic, atraumatic, b/l ears normal, nose normal  Eyes:  EOMI, conjunctiva normal, no discharge  Neck: no tracheal deviation, " supple  Cardiovascular: normal heart rate, normal rhythm, no murmurs, no rubs or gallops; no cyanosis, clubbing or edema  Lungs: Respiratory effort is normal, normal breath sounds, breath sounds clear to auscultation b/l, no rales, rhonchi or wheezing  Abdomen: soft, non-tender, no guarding or rebound  Skin: warm, dry, no erythema, no rash  Neurologic: Alert and oriented, strength 5/5 throughout except 0/5 of the L plantar-dorsiflexion, no focal deficits, CN II-XII normal, LLE wrapped in thick guaze, good movement of all toes of the L foot, good capillary refill, responsive to pinprick of all toes  Psychiatric: No anxiety or depression    #################################################################  Lab Data Review:    Objective   Recent Results (from the past 24 hour(s))   CBC w/ Differential    Collection Time: 03/18/18 12:56 PM   Result Value Ref Range    WBC 18.5 (H) 4.8 - 10.8 K/uL    RBC 4.56 (L) 4.70 - 6.10 M/uL    Hemoglobin 14.0 14.0 - 18.0 g/dL    Hematocrit 41.1 (L) 42.0 - 52.0 %    MCV 90.1 81.4 - 97.8 fL    MCH 30.7 27.0 - 33.0 pg    MCHC 34.1 33.7 - 35.3 g/dL    RDW 41.5 35.9 - 50.0 fL    Platelet Count 220 164 - 446 K/uL    MPV 10.3 9.0 - 12.9 fL    Neutrophils-Polys 91.90 (H) 44.00 - 72.00 %    Lymphocytes 4.20 (L) 22.00 - 41.00 %    Monocytes 3.30 0.00 - 13.40 %    Eosinophils 0.00 0.00 - 6.90 %    Basophils 0.20 0.00 - 1.80 %    Immature Granulocytes 0.40 0.00 - 0.90 %    Nucleated RBC 0.00 /100 WBC    Neutrophils (Absolute) 17.04 (H) 1.82 - 7.42 K/uL    Lymphs (Absolute) 0.78 (L) 1.00 - 4.80 K/uL    Monos (Absolute) 0.61 0.00 - 0.85 K/uL    Eos (Absolute) 0.00 0.00 - 0.51 K/uL    Baso (Absolute) 0.04 0.00 - 0.12 K/uL    Immature Granulocytes (abs) 0.07 0.00 - 0.11 K/uL    NRBC (Absolute) 0.00 K/uL   Complete Metabolic Panel (CMP)    Collection Time: 03/18/18 12:56 PM   Result Value Ref Range    Sodium 139 135 - 145 mmol/L    Potassium 3.8 3.6 - 5.5 mmol/L    Chloride 108 96 - 112 mmol/L     Co2 23 20 - 33 mmol/L    Anion Gap 8.0 0.0 - 11.9    Glucose 98 65 - 99 mg/dL    Bun 22 8 - 22 mg/dL    Creatinine 1.26 0.50 - 1.40 mg/dL    Calcium 9.3 8.5 - 10.5 mg/dL    AST(SGOT) 26 12 - 45 U/L    ALT(SGPT) 22 2 - 50 U/L    Alkaline Phosphatase 60 30 - 99 U/L    Total Bilirubin 0.6 0.1 - 1.5 mg/dL    Albumin 4.0 3.2 - 4.9 g/dL    Total Protein 6.2 6.0 - 8.2 g/dL    Globulin 2.2 1.9 - 3.5 g/dL    A-G Ratio 1.8 g/dL   Prothrombin (PT/INR)    Collection Time: 18 12:56 PM   Result Value Ref Range    PT 14.8 (H) 12.0 - 14.6 sec    INR 1.19 (H) 0.87 - 1.13   APTT    Collection Time: 18 12:56 PM   Result Value Ref Range    APTT 24.3 (L) 24.7 - 36.0 sec   ESTIMATED GFR    Collection Time: 18 12:56 PM   Result Value Ref Range    GFR If African American >60 >60 mL/min/1.73 m 2    GFR If Non  56 (A) >60 mL/min/1.73 m 2   EKG (ER)    Collection Time: 18  1:19 PM   Result Value Ref Range    Report       [facility]    Test Date:  2018  Pt Name:    NILS CROFT               Department: ER  MRN:        [id number]                      Room:       [facility]  Gender:     Male                         Technician: [id number]  :        1940                   Requested By:JANETTE SORENSON  Order #:    [id number]                    Reading MD:    Measurements  Intervals                                Axis  Rate:       62                           P:          38  IL:         196                          QRS:        15  QRSD:       78                           T:          48  QT:         392  QTc:        398    Interpretive Statements  SINUS RHYTHM  RSR' IN V1 OR V2, PROBABLY NORMAL VARIANT  No previous ECG available for comparison         (click the triangle to expand results)    My interpretation of lab results:     Imaging/Procedures Review:    DX-CHEST-PORTABLE (1 VIEW)   Final Result      1.  There is no acute cardiopulmonary process.      DX-ANKLE 2-  VIEWS LEFT   Final Result      Interval reduction of previously noted ankle dislocation.      Trimalleolar ankle fracture.      Soft tissue swelling.      DX-ANKLE 2- VIEWS LEFT   Final Result      Left ankle fracture/dislocation.      DX-TIBIA AND FIBULA LEFT   Final Result      Left ankle fracture/dislocation.          EKG:   per my independent read:  -NSR, HR 62, no e/o acute ST segment changes    Assessment and Plan:      * Ankle fracture- (present on admission)   Assessment & Plan    -s/p reduction in the ER  -orthopedics consulted, is medically cleared for surgery, he is low risk, no further cardiac work up needed at this time  -pain control  -PT/OT        Leukocytosis- (present on admission)   Assessment & Plan    -no e/o infection at this time, likely stress reaction from fall and fracture  -trend wbc/fever curve, no abx's at this time        Fall- (present on admission)   Assessment & Plan    -mechanical in nature while on ice, no LOC, no need for cardiac work up at this time, 12-lead EKG is normal        Dyslipidemia- (present on admission)   Assessment & Plan    -consider statin        Anxiety- (present on admission)   Assessment & Plan    -xanax PRN        Neuropathy (CMS-HCC)- (present on admission)   Assessment & Plan    -idiopathic, outpatient pain meds              1. Prophylaxis: SCDs  2. Code: Full code per patient with himself present    This dictation was created using voice recognition software. The accuracy of the dictation is limited to the abilities of the software. I expect there may be some errors of grammar and possibly content.

## 2018-03-19 NOTE — CARE PLAN
Problem: Knowledge Deficit  Goal: Knowledge of disease process/condition, treatment plan, diagnostic tests, and medications will improve    Intervention: Assess knowledge level of disease process/condition, treatment plan, diagnostic tests, and medications  Went over POC surgery 3/19/2018

## 2018-03-19 NOTE — DISCHARGE INSTRUCTIONS
Discharge Instructions    Discharged to home by car with relative. Discharged via wheelchair, hospital escort: Yes.  Special equipment needed: Crutches    Be sure to schedule a follow-up appointment with your primary care doctor or any specialists as instructed.     Discharge Plan:   Influenza Vaccine Indication: Patient Refuses    I understand that a diet low in cholesterol, fat, and sodium is recommended for good health. Unless I have been given specific instructions below for another diet, I accept this instruction as my diet prescription.   Other diet: regular     Special Instructions:     Patient may discharge home when:     1. Tolerating oral diet   2. Voiding without difficulty   3. Pain controlled by oral medications   4. Able to ambulate safely with gait aids     DISCHARGE INSTRUCTIONS:     ACTIVITY:  The patient should remain toe touch weightbearing with the use of gait aids (crutches, walker, cane, etc).     PAIN CONTROL:  The patient has been prescribed a narcotic pain medication.  Side effects of narcotics pain medications include sedation and constipation.  To prevent constipation, it is recommended that the patient take an over the counter stool softener (such as Colace or Senna) and use laxatives as needed to prevent constipation.  Take these over the counter medications as directed by the packaging.  The patient may not drive while taking narcotic pain medication.  The patient should wean off their prescription pain medication by taking over the counter analgesics (such as Tylenol, Advil, Ibuprofen, etc).  Use caution when taking acetaminophen (Tylenol), as some narcotic medications contain acetaminophen.  The total dose of acetaminophen should not exceed 3000 mg daily.     WOUND CARE:  The patient has a surgical wound which was closed with staples or sutures.  These need to be removed 10-14 days after surgery.  If the patient is not in a splint or cast, the operative dressing should be removed 2  days after surgery, and dry gauze dressing changes should be performed daily after that.  You may shower when the operative dressing is removed.     SPLINT/CAST CARE:  If present, you should keep your splint or cast clean, dry, and intact.  You should elevate your operative extremity to minimize swelling in your fingers or toes.  If the sensation in your fingers or toes of your operative extremity changes, or the fingers/toes change colors, or should your pain become too difficult to control, you should immediately elevate your operative extremity.  If these symptoms do not resolve after 30 minutes to 1 hour, you should seek medical care immediately.     CALL YOUR DOCTOR IF:  You have fever >101.5 degrees F, you have increased or concerning wound drainage, you notice a great deal of redness around your incision, your pain can no longer be controlled, the sensation in your fingers or toes changes and does not respond to elevation, your cast or splint becomes saturated (wet) or other concerns.  If you suffer a medical emergency, seek immediate medical care at your nearest Emergency Room.   Additional Information Start Time Order ID Status   Service: --    Verbal Mode: --    Update Patient Class: --    Ordering User: Pedro Pablo Yates M.D.    Process Instructions: --     03/19/18 1054            · Is patient discharged on Warfarin / Coumadin?   No     Depression / Suicide Risk    As you are discharged from this [facility] facility, it is important to learn how to keep safe from harming yourself.    Recognize the warning signs:  · Abrupt changes in personality, positive or negative- including increase in energy   · Giving away possessions  · Change in eating patterns- significant weight changes-  positive or negative  · Change in sleeping patterns- unable to sleep or sleeping all the time   · Unwillingness or inability to communicate  · Depression  · Unusual sadness, discouragement and loneliness  · Talk of wanting  to die  · Neglect of personal appearance   · Rebelliousness- reckless behavior  · Withdrawal from people/activities they love  · Confusion- inability to concentrate     If you or a loved one observes any of these behaviors or has concerns about self-harm, here's what you can do:  · Talk about it- your feelings and reasons for harming yourself  · Remove any means that you might use to hurt yourself (examples: pills, rope, extension cords, firearm)  · Get professional help from the community (Mental Health, Substance Abuse, psychological counseling)  · Do not be alone:Call your Safe Contact- someone whom you trust who will be there for you.  · Call your local CRISIS HOTLINE 936-5498 or 113-776-9268  · Call your local Children's Mobile Crisis Response Team [place] (917) 184-7987 or www.[employer]  · Call the toll free National Suicide Prevention Hotlines   · National Suicide Prevention Lifeline 520-477-TAPV (3879)  · National Hope Line Network 800-SUICIDE (833-2100)

## 2018-03-19 NOTE — THERAPY
"Physical Therapy Evaluation completed.   Bed Mobility:  Supine to Sit: Supervised  Transfers: Sit to Stand: Stand by Assist  Gait: Level Of Assist: Stand by Assist with Front-Wheel Walker       Plan of Care: Patient with no further skilled PT needs in the acute care setting at this time  Discharge Recommendations: Equipment: Front-Wheel Walker.     Mr. Richardson is a 78 y/o male who presents to acute secondary to slip on ice which resulted in L trimalleolar fracture that is s/p ORIF. He is TTWB L LE. He presents with significant dynamic balance impairments and gross motor coordination deficits due to weight bearing status. FWW remedied balance impairments. Cues for sequencing and safety with gait and transfers. Tactile and visual demonstration to correctly follow TTWB status. Pt is impulsive and tends to move very quickly. Max cues for safety and sequencing. Spouse and daughter present and were taught safety with weight bearing, gait, and transfers as well as pts poor attention may be due to post-anesthesia effect. Pt and family very anxious to continue trip to [facility]. If he has his family with him he will be able to have his deficits managed by outpatient or home health physical therapy in his new residence. He will need FWW for discharge. As pt is able to perform all physical mobility without assist and his family has participated in safety training, no additional acute physical therapy needs at this time.     See \"Rehab Therapy-Acute\" Patient Summary Report for complete documentation.     "

## 2018-03-19 NOTE — ASSESSMENT & PLAN NOTE
-no e/o infection at this time, likely stress reaction from fall and fracture  -trend wbc/fever curve, no abx's at this time

## 2018-03-19 NOTE — ASSESSMENT & PLAN NOTE
-s/p reduction in the ER  -orthopedics consulted, is medically cleared for surgery, he is low risk, no further cardiac work up needed at this time  -pain control  -PT/OT

## 2018-03-19 NOTE — CARE PLAN
Problem: Pain Management  Goal: Pain level will decrease to patient's comfort goal  Outcome: PROGRESSING AS EXPECTED  Pt reports pain manageable at this time     Problem: Mobility  Goal: Risk for activity intolerance will decrease  Outcome: PROGRESSING SLOWER THAN EXPECTED  Unable to mobilize pending sx

## 2018-03-19 NOTE — FACE TO FACE
Face to Face Note  -  Durable Medical Equipment    Thomas Segura M.D. - NPI: [id number]  I certify that this patient is under my care and that they had a durable medical equipment(DME)face to face encounter by myself that meets the physician DME face-to-face encounter requirements with this patient on:    Date of encounter:   Patient:                    MRN:                       YOB: 2016  Augustus Richardson  [id number]  1940     The encounter with the patient was in whole, or in part, for the following medical condition, which is the primary reason for durable medical equipment:  Other - .post left ankle fx ORIF    I certify that, based on my findings, the following durable medical equipment is medically necessary:  Walkers.    HOME O2 Saturation Measurements:(Values must be present for Home Oxygen orders)         ,     ,         My Clinical findings support the need for the above equipment due to:  Abnormal Gait       Supporting Symptoms: ankle pain,post op weakness

## 2018-03-20 NOTE — THERAPY
"Occupational Therapy Evaluation completed.   Functional Status:  Pt in chair upon OT's arrival.  Pt stood and walked to bathroom w/FWW maintaining TTWB.  SBA for toilet transfer.  Pt donned shorts with CGA, pt did have LOB backwards in standing during this task requiring assist to regain balance.  Pt educated on adaptive strategies and shower AE.  Pt very impulsive and likely will have difficulty retaining education 2' to anesthesia effect.  Pt issued handout.  Plan of Care: Will benefit from Occupational Therapy 3 times per week  Discharge Recommendations:  Equipment: Front-Wheel Walker and Tub Transfer Bench. Post-acute therapy Currently anticipate no further skilled therapy needs once patient is discharged from the inpatient setting.    See \"Rehab Therapy-Acute\" Patient Summary Report for complete documentation.    "

## 2018-03-20 NOTE — DISCHARGE SUMMARY
Hospital Medicine Discharge Note     Admit Date:  3/18/2018       Discharge Date:   3/19/2018    Attending Physician:  Thomas Segura M.D.      Diagnoses (includes active and resolved):     Principal Problem:    Left Ankle fracture POA: Yes, post ORIF  Active Problems:    Neuropathy (CMS-HCC) POA: Yes    Anxiety POA: Yes    Dyslipidemia POA: Yes    Fall POA: Yes    Leukocytosis POA: Yes from stress reaction, resolved      Hospital Summary (Brief Narrative):         Patient is an 87-year-old male was admitted after he slipped on some ice moving boxes into a moving truck with complaints of left ankle pain.  He had findings of Left trimalleolar fracture .  Underwent ORIF on 3/19/18 by orthopedics.  Postop pain was fairly well controlled.  Is recommended for walker use with continued toe touch weightbearing to left lower extremity and outpatient follow-up with orthopedics.  Recommended for follow-up wound check and repeat x-rays in proximal 2 weeks by orthopedics.  In stable condition, patient was discharged home.    Consultants:        Dr. Yates , Jessie.     Imaging/ Testing:      DX-PORTABLE FLUORO > 1 HOUR   Final Result         Portable fluoroscopy utilized for 12 seconds.         DX-ANKLE 2- VIEWS LEFT   Final Result      Reduction/internal fixation left ankle fracture.      DX-CHEST-PORTABLE (1 VIEW)   Final Result      1.  There is no acute cardiopulmonary process.      DX-ANKLE 2- VIEWS LEFT   Final Result      Interval reduction of previously noted ankle dislocation.      Trimalleolar ankle fracture.      Soft tissue swelling.      DX-ANKLE 2- VIEWS LEFT   Final Result      Left ankle fracture/dislocation.      DX-TIBIA AND FIBULA LEFT   Final Result      Left ankle fracture/dislocation.            Procedures:          DATE OF SERVICE:  03/19/2018     PREOPERATIVE DIAGNOSIS:  Trimalleolar left ankle fracture.     POSTOPERATIVE DIAGNOSIS:  Trimalleolar left ankle fracture.     PROCEDURE:  Open reduction and  internal fixation, left trimalleolar ankle   fracture without fixation of posterior lip.     SURGEON:  Pedro Pablo Yates MD     ASSISTANT:  Yury Huffman MD    Discharge Medications:             Medication List      START taking these medications      Instructions   oxyCODONE immediate-release 5 MG Tabs  Commonly known as:  ROXICODONE   Take 1 Tab by mouth every 3 hours as needed for up to 5 days.  Dose:  5 mg        CONTINUE taking these medications      Instructions   ALPRAZolam 0.5 MG Tabs  Commonly known as:  XANAX   Take 0.5 mg by mouth at bedtime as needed for Sleep.  Dose:  0.5 mg     ascorbic acid 500 MG Tabs  Commonly known as:  ascorbic acid   Take 500 mg by mouth every day.  Dose:  500 mg     magnesium oxide 400 MG Tabs  Commonly known as:  MAG-OX   Take 400 mg by mouth every day.  Dose:  400 mg     NON SPECIFIED   Take 1 Cap by mouth every day. Beet root.  Dose:  1 Cap     TURMERIC PO   Take 720 mg by mouth every day.  Dose:  720 mg     Vitamin B-Complex Tabs   Take 1 tablet by mouth every day.  Dose:  1 tablet     Vitamin D3 5000 units Tabs   Take 5,000 Units by mouth every day.  Dose:  5000 Units     vitamin e 400 UNIT Caps  Commonly known as:  VITAMIN E   Take 400 Units by mouth every day.  Dose:  400 Units     Zinc 50 MG Tabs   Take 50 mg by mouth every day.  Dose:  50 mg               Diet:       DIET ORDERS (Through next 24h)    As tolerated.             Activity:   As tolerated.      Code status:   Full code    Primary Care Provider:    JIL Garcia    Follow up appointment details :      .  JIL Garcia  [address] [address] [phone]    Schedule an appointment as soon as possible for a visit in 1 week  Hospital follow-up appointment with PCP    Pedro Pablo Yates M.D.  [address]  [facility] [id number] [phone]    In 2 weeks      JIL Garcia  [address] [facility] [address] [place] [id number] [phone]    In 2  weeks            Time spent on discharge day patient visit: 37 minutes    #################################################

## 2018-03-20 NOTE — PROGRESS NOTES
Pt accepted from PACU, a&ox4, vss, splint to LLE, voiding qs, tolerating regular diet, went over discharge instructions with pt, cd with x-ray given.

## 2018-03-26 LAB — EKG IMPRESSION: NORMAL

## 2018-04-18 NOTE — PROGRESS NOTES
Outcome: disconnected number     Please transfer to Patient Outreach Team at [phone] when patient returns call.    Attempt # 2

## 2021-01-11 DIAGNOSIS — Z23 NEED FOR VACCINATION: ICD-10-CM

## 2023-02-25 NOTE — THERAPY
OT orders recevied. Pt to surgery today. Will hold until post op.   [Mother] : mother [Father] : father [Sister] : sister [None] : none [Smokers in Household] : there are no smokers in the home

## (undated) DEVICE — ELECTRODE DUAL RETURN W/ CORD - (50/PK)

## (undated) DEVICE — DRILL BIT 2.7 X 160 CANN OIC - (5TX2=10)

## (undated) DEVICE — PACK LOWER EXTREMITY - (2/CA)

## (undated) DEVICE — SUTURE 2-0 MONOCRYL CT-1

## (undated) DEVICE — CHLORAPREP 26 ML APPLICATOR - ORANGE TINT(25/CA)

## (undated) DEVICE — GLOVE BIOGEL INDICATOR SZ 8 SURGICAL PF LTX - (50/BX 4BX/CA)

## (undated) DEVICE — ELECTRODE 850 FOAM ADHESIVE - HYDROGEL RADIOTRNSPRNT (50/PK)

## (undated) DEVICE — HEAD HOLDER JUNIOR/ADULT

## (undated) DEVICE — SUCTION INSTRUMENT YANKAUER BULBOUS TIP W/O VENT (50EA/CA)

## (undated) DEVICE — KIT ROOM DECONTAMINATION

## (undated) DEVICE — SENSOR SPO2 NEO LNCS ADHESIVE (20/BX) SEE USER NOTES

## (undated) DEVICE — BANDAGE ELASTIC STERILE MATRIX 6 X 10 (20EA/CA)

## (undated) DEVICE — SPLINT PLASTER 5 IN X 30 IN - (50EA/BX 6BX/CA)

## (undated) DEVICE — SET LEADWIRE 5 LEAD BEDSIDE DISPOSABLE ECG (1SET OF 5/EA)

## (undated) DEVICE — SET EXTENSION WITH 2 PORTS (48EA/CA) ***PART #2C8610 IS A SUBSTITUTE*****

## (undated) DEVICE — GLOVE BIOGEL SZ 7.5 SURGICAL PF LTX - (50PR/BX 4BX/CA)

## (undated) DEVICE — SUTURE GENERAL

## (undated) DEVICE — SLEEVE, VASO, THIGH, MED

## (undated) DEVICE — PROTECTOR ULNA NERVE - (36PR/CA)

## (undated) DEVICE — KIT ANESTHESIA W/CIRCUIT & 3/LT BAG W/FILTER (20EA/CA)

## (undated) DEVICE — DRILL BIT 1.8MMX80MM CALIBRATED (4TX2=8)

## (undated) DEVICE — SUTURE 2-0 VICRYL PLUS CT-1 - 8 X 18 INCH(12/BX)

## (undated) DEVICE — SODIUM CHL IRRIGATION 0.9% 1000ML (12EA/CA)

## (undated) DEVICE — TUBING CLEARLINK DUO-VENT - C-FLO (48EA/CA)

## (undated) DEVICE — NEPTUNE 4 PORT MANIFOLD - (20/PK)

## (undated) DEVICE — GOWN WARMING STANDARD FLEX - (30/CA)

## (undated) DEVICE — CANISTER SUCTION 3000ML MECHANICAL FILTER AUTO SHUTOFF MEDI-VAC NONSTERILE LF DISP  (40EA/CA)

## (undated) DEVICE — DRILL BIT 2.8MM X 155MM CALIBRATED (8TX2=16)

## (undated) DEVICE — PAD PREP 24 X 48 CUFFED - (100/CA)

## (undated) DEVICE — LACTATED RINGERS INJ 1000 ML - (14EA/CA 60CA/PF)

## (undated) DEVICE — BANDAGE ELASTIC 6 HONEYCOMB - 6X5YD LF (20/CA)"

## (undated) DEVICE — PADDING CAST 6 IN STERILE - 6 X 4 YDS (24/CA)

## (undated) DEVICE — DRAPE C-ARM LARGE 41IN X 74 IN - (10/BX 2BX/CA)

## (undated) DEVICE — BLADE SURGICAL #15 - (50/BX 3BX/CA)

## (undated) DEVICE — MASK ANESTHESIA ADULT  - (100/CA)